# Patient Record
Sex: MALE | Employment: FULL TIME | URBAN - METROPOLITAN AREA
[De-identification: names, ages, dates, MRNs, and addresses within clinical notes are randomized per-mention and may not be internally consistent; named-entity substitution may affect disease eponyms.]

---

## 2018-07-27 ENCOUNTER — HOSPITAL ENCOUNTER (OUTPATIENT)
Dept: RADIATION ONCOLOGY | Age: 58
Discharge: HOME | End: 2018-07-27
Payer: COMMERCIAL

## 2018-07-27 VITALS
TEMPERATURE: 96.2 F | DIASTOLIC BLOOD PRESSURE: 72 MMHG | WEIGHT: 219.4 LBS | HEIGHT: 68 IN | BODY MASS INDEX: 33.25 KG/M2 | RESPIRATION RATE: 14 BRPM | SYSTOLIC BLOOD PRESSURE: 128 MMHG | OXYGEN SATURATION: 97 % | HEART RATE: 47 BPM

## 2018-07-27 DIAGNOSIS — C61 PROSTATE CANCER (HCC): ICD-10-CM

## 2018-07-27 PROCEDURE — 99212 OFFICE O/P EST SF 10 MIN: CPT | Performed by: RADIOLOGY

## 2018-07-27 RX ORDER — LISINOPRIL 5 MG/1
5 TABLET ORAL DAILY
COMMUNITY

## 2018-07-27 RX ORDER — ATORVASTATIN CALCIUM 80 MG/1
80 TABLET, FILM COATED ORAL DAILY
COMMUNITY
End: 2018-12-13 | Stop reason: ALTCHOICE

## 2018-07-27 RX ORDER — ASPIRIN 81 MG/1
81 TABLET, CHEWABLE ORAL DAILY
COMMUNITY
End: 2018-12-13 | Stop reason: ALTCHOICE

## 2018-07-27 NOTE — H&P
with intermediate risk prostate cancer, with a Gustine score of 4+3=7 with PNI, clinical [id number], and initial PSA of 4.7.  5 of 12 cores were positive, with the high-grade disease coming from the right base with PNI found at that location. I discussed treatment options with the patient. He declines consideration of surgery. I recommended either external beam radiotherapy, or the same with a brachytherapy boost.  I quoted data from the recent ASCENDE-RT trial, which supports the brachytherapy boost approach for intermediate and high risk patients. The patient wished to pursue this. Given his relatively lack of urinary symptoms, I think we could initiate therapy with the brachytherapy boost, to be followed by 6 months of androgen ablation in conjunction with external beam RT to 45 Gy. Risks and benefits were discussed in detail, informed consent signed. We also discussed relation safety issues and the logistics of the procedure. We discussed SpaceOAR gel, and I would plan to place this after the brachytherapy procedure. The patient will return in a few weeks for a volume study, to quantify prostate geometry and rule out pubic arch interference. Thank you for this consult and allowing us to participate in the care of this patient.      Electronically signed by Kelli Troy MD on 7/27/18 at 11:24 AM

## 2018-08-13 ENCOUNTER — HOSPITAL ENCOUNTER (OUTPATIENT)
Dept: RADIATION ONCOLOGY | Age: 58
Discharge: HOME | End: 2018-08-13
Payer: COMMERCIAL

## 2018-08-13 DIAGNOSIS — C61 PROSTATE CANCER (HCC): Primary | ICD-10-CM

## 2018-08-13 PROCEDURE — 99214 OFFICE O/P EST MOD 30 MIN: CPT | Performed by: RADIOLOGY

## 2018-08-13 NOTE — ONCOLOGY
No Known Allergies     CURRENT MEDICATIONS:     Prior to Admission medications    Medication Sig Start Date End Date Taking? Authorizing Provider   ticagrelor (BRILINTA) 90 MG TABS tablet Take 90 mg by mouth 2 times daily    Historical Provider, MD   aspirin 81 MG chewable tablet Take 81 mg by mouth daily    Historical Provider, MD   atorvastatin (LIPITOR) 80 MG tablet Take 80 mg by mouth daily    Historical Provider, MD   lisinopril (PRINIVIL;ZESTRIL) 5 MG tablet Take 5 mg by mouth daily    Historical Provider, MD   metoprolol tartrate (LOPRESSOR) 25 MG tablet Take 12.5 mg by mouth 2 times daily    Historical Provider, MD     ECOG PERFORMANCE STATUS: 0    PHYSICAL EXAMINATION:  GENERAL: No acute distress. Alert, oriented, cooperative. HEENT:   WNL. Remainder of exam deferred. Here for discussion only today. STUDIES: none    IMPRESSION/PLAN:    Untreated intermediate risk prostate cancer. We will hold off with definitive therapy for a while, until the patient can come off of anticoagulation for short period (1 week). I refer him back to Dr. Chandler Rutherford for a 6 month Lupron injection, to take place on Monday, August 20. I prescribed 2 weeks of Casodex, to start today, to block the testosterone flare. I will see him back in one month to assess his PSA and testosterone, and then again 6 months from now once he has seen cardiology, so we can assess whether he will be able to come off of anticoagulation. At present our plan is to eventually proceed with radiotherapy with a brachytherapy boost, with concurrent androgen ablation. A total of 35 minutes was spent today in face-to-face discussion regarding the patient's cancer diagnosis and its management. Electronically signed by Carmen Casanova MD on 8/13/18 at 4:17 PM      Thank you for allowing us to participate in the care of this patient.   cc:   No att. providers found  MD Arnold Gonzalez MD  [address] [facility]. [address]

## 2018-09-20 ENCOUNTER — HOSPITAL ENCOUNTER (OUTPATIENT)
Dept: RADIATION ONCOLOGY | Age: 58
Discharge: HOME | End: 2018-09-20
Payer: COMMERCIAL

## 2018-09-20 VITALS
BODY MASS INDEX: 34.33 KG/M2 | WEIGHT: 225.8 LBS | SYSTOLIC BLOOD PRESSURE: 151 MMHG | TEMPERATURE: 97.7 F | HEART RATE: 95 BPM | OXYGEN SATURATION: 97 % | DIASTOLIC BLOOD PRESSURE: 70 MMHG | RESPIRATION RATE: 16 BRPM

## 2018-09-20 DIAGNOSIS — C61 PROSTATE CANCER (HCC): Primary | ICD-10-CM

## 2018-09-20 PROCEDURE — 99212 OFFICE O/P EST SF 10 MIN: CPT | Performed by: RADIOLOGY

## 2018-09-20 NOTE — ONCOLOGY
RADIATION ONCOLOGY FOLLOW-UP    DATE OF VISIT: 9/20/2018    Patient Active Problem List   Diagnosis Code    Prostate cancer [facility]) C61       DIAGNOSIS: 59-year-old male with intermediate risk prostate cancer, with a Dany score of 4+3=7 with PNI, clinical [id number], and initial PSA of 4.7.  5 of 12 cores were positive, with the high-grade disease coming from the right base with PNI found at that location.      PRIOR TREATMENT: We intended to proceed with treatment, with concurrent androgen ablation and radiotherapy with a seed implant boost.  The patient had a recent cardiac event resulting in a cardiac stent, and is currently on aspirin and an anticoagulant (Brilinta).      TIME SINCE TREATMENT: N/A     INTERVAL HISTORY: Patient was premedicated with Casodex, and I received a 6 month Lupron injection from Dr. Sheldon Elaine.  I have him return today to check a PSA and testosterone, to ensure that the medication was effective in his case. The testosterone is at castrate levels, and the PSA has dropped to 0.8 as of 9/19/18. He has been experiencing fairly severe hot flashes and sweats, with drenching sweats at night. His IPSS score has gone up, primarily because he wakes up often at night due to sweats, and urinates at that time. No dysuria or hematuria. He has persistent left shoulder pain, which predates his cardiac diagnosis and did not change after his cardiac intervention and stenting procedure. He plans to discuss this with his primary physician, as it may require an orthopedic referral.  There is no loss of range of motion. The patient continues to work, but notes easy fatigue. No chest pain.     PAST MEDICAL HISTORY:   Past Medical History:   Diagnosis Date    CAD (coronary artery disease) 2018    MI   7/25/18    Hypertension        PAST SURGICAL HISTORY:  Past Surgical History:   Procedure Laterality Date    CORONARY ANGIOPLASTY WITH STENT PLACEMENT  07/25/2018        History   Smoking Status    Former in the care of this patient.   cc:   No att. providers found  MD Shelly Pompa MD  [facility].  [id number]  [place], [facility] [address]

## 2018-12-13 ENCOUNTER — HOSPITAL ENCOUNTER (OUTPATIENT)
Dept: RADIATION ONCOLOGY | Age: 58
Discharge: HOME | End: 2018-12-13
Payer: COMMERCIAL

## 2018-12-13 VITALS
OXYGEN SATURATION: 100 % | DIASTOLIC BLOOD PRESSURE: 68 MMHG | BODY MASS INDEX: 33.15 KG/M2 | WEIGHT: 218 LBS | SYSTOLIC BLOOD PRESSURE: 150 MMHG | HEART RATE: 84 BPM | RESPIRATION RATE: 16 BRPM | TEMPERATURE: 99.1 F

## 2018-12-13 DIAGNOSIS — C61 PROSTATE CANCER (HCC): Primary | ICD-10-CM

## 2018-12-13 PROCEDURE — 99212 OFFICE O/P EST SF 10 MIN: CPT | Performed by: RADIOLOGY

## 2018-12-13 RX ORDER — TRAMADOL HYDROCHLORIDE 50 MG/1
50 TABLET ORAL EVERY 6 HOURS PRN
COMMUNITY
End: 2020-01-22 | Stop reason: ALTCHOICE

## 2018-12-13 RX ORDER — NITROGLYCERIN 0.4 MG/1
0.4 TABLET SUBLINGUAL EVERY 5 MIN PRN
COMMUNITY

## 2018-12-13 RX ORDER — INSULIN GLARGINE 100 [IU]/ML
50 INJECTION, SOLUTION SUBCUTANEOUS NIGHTLY
COMMUNITY
End: 2020-01-22 | Stop reason: ALTCHOICE

## 2018-12-13 RX ORDER — ATORVASTATIN CALCIUM 20 MG/1
40 TABLET, FILM COATED ORAL NIGHTLY
COMMUNITY

## 2019-02-13 ENCOUNTER — HOSPITAL ENCOUNTER (OUTPATIENT)
Dept: RADIATION ONCOLOGY | Age: 59
Discharge: HOME | End: 2019-02-13
Payer: COMMERCIAL

## 2019-02-13 PROCEDURE — 99213 OFFICE O/P EST LOW 20 MIN: CPT | Performed by: RADIOLOGY

## 2019-02-13 PROCEDURE — 77470 SPECIAL RADIATION TREATMENT: CPT | Performed by: RADIOLOGY

## 2019-02-13 PROCEDURE — 76873 ECHOGRAP TRANS R PROS STUDY: CPT | Performed by: RADIOLOGY

## 2019-02-18 PROCEDURE — 77318 BRACHYTX ISODOSE COMPLEX: CPT | Performed by: RADIOLOGY

## 2019-03-20 ENCOUNTER — HOSPITAL ENCOUNTER (OUTPATIENT)
Dept: RADIATION ONCOLOGY | Age: 59
Discharge: HOME | End: 2019-03-20
Payer: COMMERCIAL

## 2019-03-20 PROCEDURE — 76965 ECHO GUIDANCE RADIOTHERAPY: CPT | Performed by: RADIOLOGY

## 2019-03-20 PROCEDURE — C2638 BRACHYTX, STRANDED, I-125: HCPCS

## 2019-03-20 PROCEDURE — 77778 APPLY INTERSTIT RADIAT COMPL: CPT | Performed by: RADIOLOGY

## 2019-03-20 PROCEDURE — 77318 BRACHYTX ISODOSE COMPLEX: CPT | Performed by: RADIOLOGY

## 2019-03-20 PROCEDURE — 77334 RADIATION TREATMENT AID(S): CPT | Performed by: RADIOLOGY

## 2019-03-20 PROCEDURE — 77370 RADIATION PHYSICS CONSULT: CPT | Performed by: RADIOLOGY

## 2019-03-20 PROCEDURE — 77332 RADIATION TREATMENT AID(S): CPT | Performed by: RADIOLOGY

## 2019-04-01 ENCOUNTER — HOSPITAL ENCOUNTER (OUTPATIENT)
Dept: RADIATION ONCOLOGY | Age: 59
Discharge: HOME | End: 2019-04-01
Payer: COMMERCIAL

## 2019-04-01 DIAGNOSIS — C61 PROSTATE CANCER (HCC): Primary | ICD-10-CM

## 2019-04-01 PROCEDURE — 99213 OFFICE O/P EST LOW 20 MIN: CPT | Performed by: RADIOLOGY

## 2019-04-01 PROCEDURE — 55874 TPRNL PLMT BIODEGRDABL MATRL: CPT | Performed by: RADIOLOGY

## 2019-04-01 RX ORDER — CIPROFLOXACIN 500 MG/1
500 TABLET, FILM COATED ORAL 2 TIMES DAILY
COMMUNITY
Start: 2019-04-01 | End: 2019-04-15

## 2019-04-01 NOTE — ONCOLOGY
discomfort. When finished, the ultrasound probe was removed and we cleaned the perineum and released the patient. I placed him on Cipro prophylactically. In a few weeks we plan to do the CT simulation, the seed implant quantitatively. 45Gy rio-prostatic fractionated RT is planned.       THIS REPORT HAS BEEN ELECTRONICALLY SIGNED:  4/1/2019  9:52 AM    Dock Spatz, MD

## 2019-04-01 NOTE — ONCOLOGY
discomfort. When finished, the ultrasound probe was removed and we cleaned the perineum and released the patient. I placed him on Cipro prophylactically. In a few weeks we plan to do the CT simulation, the seed implant quantitatively. 45Gy rio-prostatic fractionated RT is planned.       THIS REPORT HAS BEEN ELECTRONICALLY SIGNED:  4/1/2019  9:14 AM    Kobe Angel MD

## 2019-04-15 VITALS
RESPIRATION RATE: 16 BRPM | DIASTOLIC BLOOD PRESSURE: 70 MMHG | HEART RATE: 60 BPM | SYSTOLIC BLOOD PRESSURE: 120 MMHG | OXYGEN SATURATION: 98 % | WEIGHT: 223.8 LBS | TEMPERATURE: 98.1 F | BODY MASS INDEX: 34.03 KG/M2

## 2019-04-15 PROCEDURE — 77290 THER RAD SIMULAJ FIELD CPLX: CPT | Performed by: RADIOLOGY

## 2019-04-15 PROCEDURE — 77334 RADIATION TREATMENT AID(S): CPT | Performed by: RADIOLOGY

## 2019-04-15 PROCEDURE — 99214 OFFICE O/P EST MOD 30 MIN: CPT | Performed by: RADIOLOGY

## 2019-04-15 RX ORDER — TAMSULOSIN HYDROCHLORIDE 0.4 MG/1
0.4 CAPSULE ORAL DAILY
Qty: 90 CAPSULE | Refills: 2 | Status: SHIPPED | OUTPATIENT
Start: 2019-04-15

## 2019-04-17 PROCEDURE — 77300 RADIATION THERAPY DOSE PLAN: CPT | Performed by: RADIOLOGY

## 2019-04-17 PROCEDURE — 77295 3-D RADIOTHERAPY PLAN: CPT | Performed by: RADIOLOGY

## 2019-04-17 PROCEDURE — 77338 DESIGN MLC DEVICE FOR IMRT: CPT | Performed by: RADIOLOGY

## 2019-04-17 PROCEDURE — 77301 RADIOTHERAPY DOSE PLAN IMRT: CPT | Performed by: RADIOLOGY

## 2019-04-23 PROCEDURE — 77280 THER RAD SIMULAJ FIELD SMPL: CPT | Performed by: RADIOLOGY

## 2019-04-23 PROCEDURE — 77387 GUIDANCE FOR RADJ TX DLVR: CPT | Performed by: RADIOLOGY

## 2019-05-01 ENCOUNTER — HOSPITAL ENCOUNTER (OUTPATIENT)
Dept: RADIATION ONCOLOGY | Age: 59
Discharge: HOME | End: 2019-05-01
Payer: COMMERCIAL

## 2019-05-08 PROCEDURE — 77385 HC NTSTY MODUL RAD TX DLVR SMPL: CPT | Performed by: RADIOLOGY

## 2019-05-09 PROCEDURE — 77385 HC NTSTY MODUL RAD TX DLVR SMPL: CPT | Performed by: RADIOLOGY

## 2019-05-10 PROCEDURE — 77385 HC NTSTY MODUL RAD TX DLVR SMPL: CPT | Performed by: RADIOLOGY

## 2019-05-13 PROCEDURE — 77385 HC NTSTY MODUL RAD TX DLVR SMPL: CPT | Performed by: RADIOLOGY

## 2019-05-14 PROCEDURE — 77385 HC NTSTY MODUL RAD TX DLVR SMPL: CPT | Performed by: RADIOLOGY

## 2019-05-14 PROCEDURE — 77336 RADIATION PHYSICS CONSULT: CPT | Performed by: RADIOLOGY

## 2019-05-15 PROCEDURE — 77385 HC NTSTY MODUL RAD TX DLVR SMPL: CPT | Performed by: RADIOLOGY

## 2019-05-16 PROCEDURE — 77385 HC NTSTY MODUL RAD TX DLVR SMPL: CPT | Performed by: RADIOLOGY

## 2019-05-17 PROCEDURE — 77385 HC NTSTY MODUL RAD TX DLVR SMPL: CPT | Performed by: RADIOLOGY

## 2019-05-20 PROCEDURE — 77385 HC NTSTY MODUL RAD TX DLVR SMPL: CPT | Performed by: RADIOLOGY

## 2019-05-21 PROCEDURE — 99212 OFFICE O/P EST SF 10 MIN: CPT | Performed by: RADIOLOGY

## 2019-05-21 PROCEDURE — 77385 HC NTSTY MODUL RAD TX DLVR SMPL: CPT | Performed by: RADIOLOGY

## 2019-05-21 PROCEDURE — 77336 RADIATION PHYSICS CONSULT: CPT | Performed by: RADIOLOGY

## 2019-05-22 PROCEDURE — 77385 HC NTSTY MODUL RAD TX DLVR SMPL: CPT | Performed by: RADIOLOGY

## 2019-05-23 PROCEDURE — 77385 HC NTSTY MODUL RAD TX DLVR SMPL: CPT | Performed by: RADIOLOGY

## 2019-05-24 PROCEDURE — 77385 HC NTSTY MODUL RAD TX DLVR SMPL: CPT | Performed by: RADIOLOGY

## 2019-05-28 PROCEDURE — 99212 OFFICE O/P EST SF 10 MIN: CPT | Performed by: RADIOLOGY

## 2019-05-28 PROCEDURE — 77385 HC NTSTY MODUL RAD TX DLVR SMPL: CPT | Performed by: RADIOLOGY

## 2019-05-29 PROCEDURE — 77385 HC NTSTY MODUL RAD TX DLVR SMPL: CPT | Performed by: RADIOLOGY

## 2019-05-29 PROCEDURE — 77336 RADIATION PHYSICS CONSULT: CPT | Performed by: RADIOLOGY

## 2019-05-30 PROCEDURE — 77385 HC NTSTY MODUL RAD TX DLVR SMPL: CPT | Performed by: RADIOLOGY

## 2019-05-31 PROCEDURE — 77385 HC NTSTY MODUL RAD TX DLVR SMPL: CPT | Performed by: RADIOLOGY

## 2019-06-03 ENCOUNTER — HOSPITAL ENCOUNTER (OUTPATIENT)
Dept: RADIATION ONCOLOGY | Age: 59
Discharge: HOME | End: 2019-06-03
Payer: COMMERCIAL

## 2019-06-03 PROCEDURE — 77385 HC NTSTY MODUL RAD TX DLVR SMPL: CPT | Performed by: RADIOLOGY

## 2019-06-04 PROCEDURE — 77385 HC NTSTY MODUL RAD TX DLVR SMPL: CPT | Performed by: RADIOLOGY

## 2019-06-04 PROCEDURE — 99212 OFFICE O/P EST SF 10 MIN: CPT | Performed by: RADIOLOGY

## 2019-06-05 PROCEDURE — 77385 HC NTSTY MODUL RAD TX DLVR SMPL: CPT | Performed by: RADIOLOGY

## 2019-06-05 PROCEDURE — 77336 RADIATION PHYSICS CONSULT: CPT | Performed by: RADIOLOGY

## 2019-06-06 PROCEDURE — 77385 HC NTSTY MODUL RAD TX DLVR SMPL: CPT | Performed by: RADIOLOGY

## 2019-06-07 PROCEDURE — 77385 HC NTSTY MODUL RAD TX DLVR SMPL: CPT | Performed by: RADIOLOGY

## 2019-06-10 PROCEDURE — 77385 HC NTSTY MODUL RAD TX DLVR SMPL: CPT | Performed by: RADIOLOGY

## 2019-06-11 VITALS
RESPIRATION RATE: 16 BRPM | DIASTOLIC BLOOD PRESSURE: 82 MMHG | SYSTOLIC BLOOD PRESSURE: 142 MMHG | OXYGEN SATURATION: 98 % | BODY MASS INDEX: 33.12 KG/M2 | TEMPERATURE: 97.4 F | HEART RATE: 55 BPM | WEIGHT: 217.8 LBS

## 2019-06-11 PROCEDURE — 77385 HC NTSTY MODUL RAD TX DLVR SMPL: CPT | Performed by: RADIOLOGY

## 2019-06-11 PROCEDURE — 99213 OFFICE O/P EST LOW 20 MIN: CPT | Performed by: RADIOLOGY

## 2019-06-12 PROCEDURE — 77385 HC NTSTY MODUL RAD TX DLVR SMPL: CPT | Performed by: RADIOLOGY

## 2019-06-12 PROCEDURE — 77336 RADIATION PHYSICS CONSULT: CPT | Performed by: RADIOLOGY

## 2019-06-14 NOTE — ONCOLOGY
June 14, 2019        Jasmeet Avila MD  [address]    Radiation Oncology Completion Letter    Patient Name:  Juan David Xiao  Medical Record #: [id number]   Date of Birth: [birth year]  Diagnosis:  Intermediate risk prostate cancer. PSA 4.7, [place] 4+3=7 with PNI, T2a (right sided midgland nodule). Treatment Dates: 5/8/19 - 6/12/19    Site: Dose: Total # fractions: Dose per fraction: Energy: Prescription Isodose: Technique   Prostate boost 115Gy Implant on 3/20/19  Iodine 125 MPD Brachytherapy      PTV Prostate + SV + margin 45Gy 25 1.8 Gy 10 MV photons 97.5% Isodose line IMRT/IGRT, VMAT     Concurrent therapy:  Lupron 6 month, started 6m prior to the seed implant, to delay therapy because of recent cardiac event and diagnosis of diabetes    Technique:   Intensity modulation (IMRT) was used to optimize the dose distribution and spare normal tissue toxicity, sparing dose to the rectum, bladder, femoral heads, penile bulb, and bowel. This was delivered with a set of 2 counter rotating VMAT arcs using 10MV photons modulated with an [address]. Daily cone beam CT image guidance (IGRT) was used to allow reduced planning margins reducing potential side effects, aligning to implanted I125 seeds. After the seed implant, SpaceOAR gel was placed to reduce the risk of late rectal morbidity. Treatment course:   Mr. Camron Castano tolerated radiation treatment well with expected tolerable side effects. At completion of therapy he had minimal urinary and rectal symptoms. A week prior treatment he experienced pain involving the testes, that spontaneously resolved. There was no trauma, and may have been associated with lifting at work. Such symptoms would not be expected from radiotherapy or brachytherapy. Plan: f/u in 1 month. PSA drawn, to be repeated in 3-6m. Also following with Dr. Kilo Joy.    Thank you again for allowing us to participate in the care of this patient.      Sincerely,       Saulo Tejada MD, PhD  Board Certified Radiation Oncologist  [facility] affiliated with  [id number] [facility]    cc: Dr. Phi Garcia

## 2019-07-25 ENCOUNTER — HOSPITAL ENCOUNTER (OUTPATIENT)
Dept: RADIATION ONCOLOGY | Age: 59
Discharge: HOME | End: 2019-07-25
Payer: COMMERCIAL

## 2019-07-25 VITALS
WEIGHT: 216.2 LBS | RESPIRATION RATE: 16 BRPM | SYSTOLIC BLOOD PRESSURE: 139 MMHG | TEMPERATURE: 97.2 F | DIASTOLIC BLOOD PRESSURE: 67 MMHG | HEART RATE: 57 BPM | BODY MASS INDEX: 32.87 KG/M2

## 2019-07-25 DIAGNOSIS — C61 PROSTATE CANCER (HCC): Primary | ICD-10-CM

## 2019-07-25 PROCEDURE — 99213 OFFICE O/P EST LOW 20 MIN: CPT | Performed by: RADIOLOGY

## 2020-01-22 ENCOUNTER — HOSPITAL ENCOUNTER (OUTPATIENT)
Dept: RADIATION ONCOLOGY | Age: 60
Discharge: HOME | End: 2020-01-22
Payer: COMMERCIAL

## 2020-01-22 PROCEDURE — 99212 OFFICE O/P EST SF 10 MIN: CPT | Performed by: RADIOLOGY

## 2020-01-22 RX ORDER — PIOGLITAZONEHYDROCHLORIDE 30 MG/1
30 TABLET ORAL DAILY
COMMUNITY

## 2020-01-22 RX ORDER — TADALAFIL 10 MG/1
10 TABLET ORAL DAILY PRN
Qty: 30 TABLET | Refills: 3 | Status: SHIPPED | OUTPATIENT
Start: 2020-01-22

## 2020-01-22 RX ORDER — CLOPIDOGREL BISULFATE 75 MG/1
75 TABLET ORAL DAILY
COMMUNITY

## 2020-01-22 NOTE — ONCOLOGY
NURSING ASSESSMENT     Date: 1/22/2020        Patient Name: Linde Claude     YOB: 1960      Age:  62 y.o. MRN: [id number]     Chaperone [] Yes   [] No      Advance Directives:   Do you currently have completed advance directives (living will)? [] Yes   [x] No         *If yes, please bring us a copy for your records. *If no, would you like info or assistance in completing advance directives (living will)? [] Yes   [x] No    Pain Score:   Pain Score (1-10): 0   Pain Location: 0   Pain Duration: 0  Pain Management/Control: 0      Is pain affecting your ability to take care of yourself or move throughout your home? [] Yes   [x] No    General: No Problems  Patient has gained weight [] Yes   [] No  Patient has lost weight [] Yes   [] No  How much weight in pounds and over what length of time:     Eyes (Ophthalmic): No Problem     Skin (Dermatological): No Problems     ENT: No Problems     Respiratory: No Problems     Cardiovascular: No Problems  Hx of MI l2018        Gastrointestinal: had abd pain with new me, that was changed    Genito-Urinary: see IPSS     Breast: No Problems     Musculoskeletal: No Problems  Normal aches and pains    Neurological: No Problems      Hematological and Lymphatic: No Problems     Additional Comments:  No erections poss. Concerned about this. Has desire but no function. Off insulin.

## 2020-01-22 NOTE — ONCOLOGY
ALLERGIES:   No Known Allergies     CURRENT MEDICATIONS:     Prior to Admission medications    Medication Sig Start Date End Date Taking? Authorizing Provider   pioglitazone (ACTOS) 30 MG tablet Take 30 mg by mouth daily   Yes Historical Provider, MD   clopidogrel (PLAVIX) 75 MG tablet Take 75 mg by mouth daily   Yes Historical Provider, MD   tamsulosin (FLOMAX) 0.4 MG capsule Take 1 capsule by mouth daily , best 1/2 hour after last meal of the day. 4/15/19  Yes Zachary Demarco MD   atorvastatin (LIPITOR) 20 MG tablet Take 40 mg by mouth nightly    Yes Historical Provider, MD   lisinopril (PRINIVIL;ZESTRIL) 5 MG tablet Take 5 mg by mouth daily   Yes Historical Provider, MD   metoprolol tartrate (LOPRESSOR) 25 MG tablet Take 12.5 mg by mouth 2 times daily   Yes Historical Provider, MD   nitroGLYCERIN (NITROSTAT) 0.4 MG SL tablet Place 0.4 mg under the tongue every 5 minutes as needed for Chest pain up to max of 3 total doses. If no relief after 1 dose, call 911. Historical Provider, MD       I have personally reviewed and reconciled the medications listed. Review Of Systems:   Pain Score:   Pain Score (1-10): 0   General: No Problems, no weight loss or loss of appetite. Eyes (Ophthalmic): No Problem              Skin (Dermatological): No Problems              ENT: No Problems              Respiratory: No Problems              Cardiovascular: Hx of MI 2018, no current symptoms                    Gastrointestinal: had abd pain with new me, that was changed   Genito-Urinary:  Nocturia x1-2 on Flomax, otherwise worse with difficulty starting stream.  No dysuria or hematuria. ED. Breast: No Problems              Musculoskeletal: No Problems  Normal aches and pains   Neurological: No Problems                          Hematological and Lymphatic: No Problems  A 10-point review of systems has been conducted and pertinent positives have been   recorded. All other review of systems are negative.      ECOG PERFORMANCE STATUS:    VITAL SIGNS:    There were no vitals filed for this visit. PHYSICAL EXAMINATION:  GENERAL: No acute distress. Alert, oriented, cooperative. HEENT:  PERRLA, EOMI. Oral cavity WNL. NECK:  No cervical or supraclavicular adenopathy. CHEST/LUNGS: CTA, no rib or spine tenderness  CARDIOVASCULAR:  RRR, no audible murmur  ABDOMEN: Soft, benign, normal bowel sounds. no bladder distention  EXTREMITIES: No C/C/E   RECTAL: Patient refused    STUDIES: 12/17/2019 PSA 1.3    IMPRESSION/PLAN:    Clinically BETH, with no significant urinary or rectal symptoms in the aftermath of prostate radiotherapy. Erectile dysfunction remains a problem, multifactorial.  I prescribed Cialis. The patient has a prescription for nitroglycerin, but has never used. I advised him of the risks of using these medications together. I plan to see him back in 6 months. He will be seeing Dr. Bayron Flanagan in August.    Electronically signed by Edwige Sanchez MD on 1/22/20 at 4:33 PM      Thank you for allowing us to participate in the care of this patient.   cc:   No att. providers found  MD Greg Miguel MD R Germana T[address]nger [address] [place] [address]

## 2023-03-17 LAB
ANION GAP IN SER/PLAS: 11 MMOL/L (ref 10–20)
CALCIUM (MG/DL) IN SER/PLAS: 8.6 MG/DL (ref 8.6–10.3)
CARBON DIOXIDE, TOTAL (MMOL/L) IN SER/PLAS: 29 MMOL/L (ref 21–32)
CHLORIDE (MMOL/L) IN SER/PLAS: 104 MMOL/L (ref 98–107)
CREATININE (MG/DL) IN SER/PLAS: 1.33 MG/DL (ref 0.5–1.3)
GFR MALE: 59 ML/MIN/1.73M2
GLUCOSE (MG/DL) IN SER/PLAS: 122 MG/DL (ref 74–99)
POTASSIUM (MMOL/L) IN SER/PLAS: 4.5 MMOL/L (ref 3.5–5.3)
SODIUM (MMOL/L) IN SER/PLAS: 139 MMOL/L (ref 136–145)
UREA NITROGEN (MG/DL) IN SER/PLAS: 16 MG/DL (ref 6–23)

## 2023-06-10 LAB
ALANINE AMINOTRANSFERASE (SGPT) (U/L) IN SER/PLAS: 32 U/L (ref 10–52)
ALBUMIN (G/DL) IN SER/PLAS: 4.2 G/DL (ref 3.4–5)
ALKALINE PHOSPHATASE (U/L) IN SER/PLAS: 66 U/L (ref 33–136)
ANION GAP IN SER/PLAS: 12 MMOL/L (ref 10–20)
ASPARTATE AMINOTRANSFERASE (SGOT) (U/L) IN SER/PLAS: 18 U/L (ref 9–39)
BASOPHILS (10*3/UL) IN BLOOD BY AUTOMATED COUNT: 0.05 X10E9/L (ref 0–0.1)
BASOPHILS/100 LEUKOCYTES IN BLOOD BY AUTOMATED COUNT: 1.4 % (ref 0–2)
BILIRUBIN TOTAL (MG/DL) IN SER/PLAS: 0.9 MG/DL (ref 0–1.2)
CALCIUM (MG/DL) IN SER/PLAS: 9 MG/DL (ref 8.6–10.3)
CARBON DIOXIDE, TOTAL (MMOL/L) IN SER/PLAS: 28 MMOL/L (ref 21–32)
CHLORIDE (MMOL/L) IN SER/PLAS: 105 MMOL/L (ref 98–107)
CHOLESTEROL (MG/DL) IN SER/PLAS: 118 MG/DL (ref 0–199)
CHOLESTEROL IN HDL (MG/DL) IN SER/PLAS: 49.6 MG/DL
CHOLESTEROL/HDL RATIO: 2.4
CREATININE (MG/DL) IN SER/PLAS: 1.35 MG/DL (ref 0.5–1.3)
EOSINOPHILS (10*3/UL) IN BLOOD BY AUTOMATED COUNT: 0.15 X10E9/L (ref 0–0.7)
EOSINOPHILS/100 LEUKOCYTES IN BLOOD BY AUTOMATED COUNT: 4.1 % (ref 0–6)
ERYTHROCYTE DISTRIBUTION WIDTH (RATIO) BY AUTOMATED COUNT: 12.5 % (ref 11.5–14.5)
ERYTHROCYTE MEAN CORPUSCULAR HEMOGLOBIN CONCENTRATION (G/DL) BY AUTOMATED: 32.5 G/DL (ref 32–36)
ERYTHROCYTE MEAN CORPUSCULAR VOLUME (FL) BY AUTOMATED COUNT: 95 FL (ref 80–100)
ERYTHROCYTES (10*6/UL) IN BLOOD BY AUTOMATED COUNT: 4.49 X10E12/L (ref 4.5–5.9)
ESTIMATED AVERAGE GLUCOSE FOR HBA1C: 114 MG/DL
GFR MALE: 58 ML/MIN/1.73M2
GLUCOSE (MG/DL) IN SER/PLAS: 110 MG/DL (ref 74–99)
HEMATOCRIT (%) IN BLOOD BY AUTOMATED COUNT: 42.5 % (ref 41–52)
HEMOGLOBIN (G/DL) IN BLOOD: 13.8 G/DL (ref 13.5–17.5)
HEMOGLOBIN A1C/HEMOGLOBIN TOTAL IN BLOOD: 5.6 %
IMMATURE GRANULOCYTES/100 LEUKOCYTES IN BLOOD BY AUTOMATED COUNT: 0.3 % (ref 0–0.9)
LDL: 57 MG/DL (ref 0–99)
LEUKOCYTES (10*3/UL) IN BLOOD BY AUTOMATED COUNT: 3.6 X10E9/L (ref 4.4–11.3)
LYMPHOCYTES (10*3/UL) IN BLOOD BY AUTOMATED COUNT: 1.13 X10E9/L (ref 1.2–4.8)
LYMPHOCYTES/100 LEUKOCYTES IN BLOOD BY AUTOMATED COUNT: 31 % (ref 13–44)
MONOCYTES (10*3/UL) IN BLOOD BY AUTOMATED COUNT: 0.41 X10E9/L (ref 0.1–1)
MONOCYTES/100 LEUKOCYTES IN BLOOD BY AUTOMATED COUNT: 11.3 % (ref 2–10)
NEUTROPHILS (10*3/UL) IN BLOOD BY AUTOMATED COUNT: 1.89 X10E9/L (ref 1.2–7.7)
NEUTROPHILS/100 LEUKOCYTES IN BLOOD BY AUTOMATED COUNT: 51.9 % (ref 40–80)
PLATELETS (10*3/UL) IN BLOOD AUTOMATED COUNT: 214 X10E9/L (ref 150–450)
POTASSIUM (MMOL/L) IN SER/PLAS: 4.6 MMOL/L (ref 3.5–5.3)
PROTEIN TOTAL: 6.4 G/DL (ref 6.4–8.2)
RHEUMATOID FACTOR (IU/ML) IN SERUM OR PLASMA: <10 IU/ML (ref 0–15)
SEDIMENTATION RATE, ERYTHROCYTE: <1 MM/H (ref 0–20)
SODIUM (MMOL/L) IN SER/PLAS: 140 MMOL/L (ref 136–145)
TRIGLYCERIDE (MG/DL) IN SER/PLAS: 58 MG/DL (ref 0–149)
UREA NITROGEN (MG/DL) IN SER/PLAS: 12 MG/DL (ref 6–23)
VLDL: 12 MG/DL (ref 0–40)

## 2023-11-01 ENCOUNTER — LAB (OUTPATIENT)
Dept: LAB | Facility: LAB | Age: 63
End: 2023-11-01
Payer: COMMERCIAL

## 2023-11-01 DIAGNOSIS — I12.9 HYPERTENSIVE CHRONIC KIDNEY DISEASE WITH STAGE 1 THROUGH STAGE 4 CHRONIC KIDNEY DISEASE, OR UNSPECIFIED CHRONIC KIDNEY DISEASE: ICD-10-CM

## 2023-11-01 DIAGNOSIS — E11.9 TYPE 2 DIABETES MELLITUS WITHOUT COMPLICATIONS (MULTI): ICD-10-CM

## 2023-11-01 DIAGNOSIS — E78.49 OTHER HYPERLIPIDEMIA: ICD-10-CM

## 2023-11-01 DIAGNOSIS — N18.31 CHRONIC KIDNEY DISEASE, STAGE 3A (MULTI): Primary | ICD-10-CM

## 2023-11-01 LAB
ALBUMIN SERPL BCP-MCNC: 4.4 G/DL (ref 3.4–5)
ALP SERPL-CCNC: 54 U/L (ref 33–136)
ALT SERPL W P-5'-P-CCNC: 33 U/L (ref 10–52)
ANION GAP SERPL CALC-SCNC: 15 MMOL/L (ref 10–20)
APPEARANCE UR: CLEAR
AST SERPL W P-5'-P-CCNC: 23 U/L (ref 9–39)
BASOPHILS # BLD AUTO: 0.07 X10*3/UL (ref 0–0.1)
BASOPHILS NFR BLD AUTO: 1.5 %
BILIRUB SERPL-MCNC: 1.4 MG/DL (ref 0–1.2)
BILIRUB UR STRIP.AUTO-MCNC: NEGATIVE MG/DL
BUN SERPL-MCNC: 22 MG/DL (ref 6–23)
CALCIUM SERPL-MCNC: 9.1 MG/DL (ref 8.6–10.3)
CHLORIDE SERPL-SCNC: 104 MMOL/L (ref 98–107)
CHOLEST SERPL-MCNC: 122 MG/DL (ref 0–199)
CHOLESTEROL/HDL RATIO: 2
CO2 SERPL-SCNC: 26 MMOL/L (ref 21–32)
COLOR UR: YELLOW
CREAT SERPL-MCNC: 1.2 MG/DL (ref 0.5–1.3)
CREAT UR-MCNC: 319.3 MG/DL (ref 20–370)
EOSINOPHIL # BLD AUTO: 0.11 X10*3/UL (ref 0–0.7)
EOSINOPHIL NFR BLD AUTO: 2.3 %
ERYTHROCYTE [DISTWIDTH] IN BLOOD BY AUTOMATED COUNT: 12.3 % (ref 11.5–14.5)
GFR SERPL CREATININE-BSD FRML MDRD: 67 ML/MIN/1.73M*2
GLUCOSE SERPL-MCNC: 68 MG/DL (ref 74–99)
GLUCOSE UR STRIP.AUTO-MCNC: NEGATIVE MG/DL
HCT VFR BLD AUTO: 42 % (ref 41–52)
HDLC SERPL-MCNC: 60.4 MG/DL
HGB BLD-MCNC: 13.7 G/DL (ref 13.5–17.5)
IMM GRANULOCYTES # BLD AUTO: 0.01 X10*3/UL (ref 0–0.7)
IMM GRANULOCYTES NFR BLD AUTO: 0.2 % (ref 0–0.9)
KETONES UR STRIP.AUTO-MCNC: ABNORMAL MG/DL
LDLC SERPL CALC-MCNC: 54 MG/DL
LEUKOCYTE ESTERASE UR QL STRIP.AUTO: NEGATIVE
LYMPHOCYTES # BLD AUTO: 1.13 X10*3/UL (ref 1.2–4.8)
LYMPHOCYTES NFR BLD AUTO: 23.8 %
MCH RBC QN AUTO: 30.8 PG (ref 26–34)
MCHC RBC AUTO-ENTMCNC: 32.6 G/DL (ref 32–36)
MCV RBC AUTO: 94 FL (ref 80–100)
MICROALBUMIN UR-MCNC: 7.6 MG/L
MICROALBUMIN/CREAT UR: 2.4 UG/MG CREAT
MONOCYTES # BLD AUTO: 0.39 X10*3/UL (ref 0.1–1)
MONOCYTES NFR BLD AUTO: 8.2 %
MUCOUS THREADS #/AREA URNS AUTO: NORMAL /LPF
NEUTROPHILS # BLD AUTO: 3.04 X10*3/UL (ref 1.2–7.7)
NEUTROPHILS NFR BLD AUTO: 64 %
NITRITE UR QL STRIP.AUTO: NEGATIVE
NON HDL CHOLESTEROL: 62 MG/DL (ref 0–149)
NRBC BLD-RTO: 0 /100 WBCS (ref 0–0)
PH UR STRIP.AUTO: 5 [PH]
PHOSPHATE SERPL-MCNC: 3.4 MG/DL (ref 2.5–4.9)
PLATELET # BLD AUTO: 184 X10*3/UL (ref 150–450)
POTASSIUM SERPL-SCNC: 4.1 MMOL/L (ref 3.5–5.3)
PROT SERPL-MCNC: 6.5 G/DL (ref 6.4–8.2)
PROT UR STRIP.AUTO-MCNC: ABNORMAL MG/DL
PTH-INTACT SERPL-MCNC: 73.4 PG/ML (ref 18.5–88)
RBC # BLD AUTO: 4.45 X10*6/UL (ref 4.5–5.9)
RBC # UR STRIP.AUTO: NEGATIVE /UL
RBC #/AREA URNS AUTO: NORMAL /HPF
SODIUM SERPL-SCNC: 141 MMOL/L (ref 136–145)
SP GR UR STRIP.AUTO: 1.03
TRIGL SERPL-MCNC: 40 MG/DL (ref 0–149)
UROBILINOGEN UR STRIP.AUTO-MCNC: <2 MG/DL
VLDL: 8 MG/DL (ref 0–40)
WBC # BLD AUTO: 4.8 X10*3/UL (ref 4.4–11.3)
WBC #/AREA URNS AUTO: NORMAL /HPF

## 2023-11-01 PROCEDURE — 85025 COMPLETE CBC W/AUTO DIFF WBC: CPT

## 2023-11-01 PROCEDURE — 83970 ASSAY OF PARATHORMONE: CPT

## 2023-11-01 PROCEDURE — 82570 ASSAY OF URINE CREATININE: CPT

## 2023-11-01 PROCEDURE — 36415 COLL VENOUS BLD VENIPUNCTURE: CPT

## 2023-11-01 PROCEDURE — 80053 COMPREHEN METABOLIC PANEL: CPT

## 2023-11-01 PROCEDURE — 81001 URINALYSIS AUTO W/SCOPE: CPT

## 2023-11-01 PROCEDURE — 82043 UR ALBUMIN QUANTITATIVE: CPT

## 2023-11-01 PROCEDURE — 84100 ASSAY OF PHOSPHORUS: CPT

## 2023-11-01 PROCEDURE — 80061 LIPID PANEL: CPT

## 2024-02-02 PROBLEM — R07.9 CHEST PAIN: Status: ACTIVE | Noted: 2024-02-02

## 2024-02-02 PROBLEM — R94.31 ABNORMAL EKG: Status: ACTIVE | Noted: 2024-02-02

## 2024-02-02 PROBLEM — I10 BENIGN ESSENTIAL HYPERTENSION: Status: ACTIVE | Noted: 2024-02-02

## 2024-02-02 PROBLEM — I25.10 CORONARY ARTERY DISEASE WITHOUT ANGINA PECTORIS: Status: ACTIVE | Noted: 2024-02-02

## 2024-02-02 PROBLEM — E78.5 HYPERLIPIDEMIA: Status: ACTIVE | Noted: 2024-02-02

## 2024-02-02 PROBLEM — R01.1 CARDIAC MURMUR: Status: ACTIVE | Noted: 2024-02-02

## 2024-02-02 PROBLEM — E11.9 DIABETES MELLITUS (MULTI): Status: ACTIVE | Noted: 2024-02-02

## 2024-02-02 PROBLEM — R06.00 DYSPNEA: Status: ACTIVE | Noted: 2024-02-02

## 2024-02-02 PROBLEM — Z98.61 HISTORY OF PTCA: Status: ACTIVE | Noted: 2024-02-02

## 2024-02-02 RX ORDER — CLOPIDOGREL BISULFATE 75 MG/1
75 TABLET ORAL DAILY
COMMUNITY

## 2024-02-02 RX ORDER — HYDROXYCHLOROQUINE SULFATE 200 MG/1
1 TABLET, FILM COATED ORAL 2 TIMES DAILY
COMMUNITY
Start: 2023-09-12

## 2024-02-02 RX ORDER — LISINOPRIL 10 MG/1
10 TABLET ORAL DAILY
COMMUNITY

## 2024-02-02 RX ORDER — TAMSULOSIN HYDROCHLORIDE 0.4 MG/1
0.4 CAPSULE ORAL DAILY
COMMUNITY
Start: 2019-04-15

## 2024-02-02 RX ORDER — NITROGLYCERIN 0.4 MG/1
0.4 TABLET SUBLINGUAL EVERY 5 MIN PRN
COMMUNITY

## 2024-02-02 RX ORDER — GABAPENTIN 100 MG/1
100 CAPSULE ORAL 2 TIMES DAILY
COMMUNITY
Start: 2022-04-18

## 2024-02-02 RX ORDER — OXAPROZIN 600 MG/1
600 TABLET, FILM COATED ORAL 2 TIMES DAILY
COMMUNITY
Start: 2023-06-06

## 2024-02-02 RX ORDER — METOPROLOL SUCCINATE 25 MG/1
25 TABLET, EXTENDED RELEASE ORAL DAILY
COMMUNITY

## 2024-02-02 RX ORDER — ATORVASTATIN CALCIUM 80 MG/1
80 TABLET, FILM COATED ORAL NIGHTLY
COMMUNITY

## 2024-03-11 ENCOUNTER — LAB (OUTPATIENT)
Dept: LAB | Facility: LAB | Age: 64
End: 2024-03-11
Payer: COMMERCIAL

## 2024-03-11 DIAGNOSIS — E11.9 TYPE 2 DIABETES MELLITUS WITHOUT COMPLICATIONS (MULTI): ICD-10-CM

## 2024-03-11 DIAGNOSIS — N18.2 CHRONIC KIDNEY DISEASE, STAGE 2 (MILD): Primary | ICD-10-CM

## 2024-03-11 DIAGNOSIS — I12.9 HYPERTENSIVE CHRONIC KIDNEY DISEASE WITH STAGE 1 THROUGH STAGE 4 CHRONIC KIDNEY DISEASE, OR UNSPECIFIED CHRONIC KIDNEY DISEASE: ICD-10-CM

## 2024-03-11 DIAGNOSIS — Z12.5 ENCOUNTER FOR SCREENING FOR MALIGNANT NEOPLASM OF PROSTATE: ICD-10-CM

## 2024-03-11 LAB
ALBUMIN SERPL BCP-MCNC: 4.1 G/DL (ref 3.4–5)
ALP SERPL-CCNC: 57 U/L (ref 33–136)
ALT SERPL W P-5'-P-CCNC: 35 U/L (ref 10–52)
ANION GAP SERPL CALC-SCNC: 10 MMOL/L (ref 10–20)
AST SERPL W P-5'-P-CCNC: 18 U/L (ref 9–39)
BASOPHILS # BLD AUTO: 0.06 X10*3/UL (ref 0–0.1)
BASOPHILS NFR BLD AUTO: 1.6 %
BILIRUB SERPL-MCNC: 1 MG/DL (ref 0–1.2)
BUN SERPL-MCNC: 13 MG/DL (ref 6–23)
CALCIUM SERPL-MCNC: 9.1 MG/DL (ref 8.6–10.3)
CHLORIDE SERPL-SCNC: 105 MMOL/L (ref 98–107)
CO2 SERPL-SCNC: 29 MMOL/L (ref 21–32)
CREAT SERPL-MCNC: 1.26 MG/DL (ref 0.5–1.3)
EGFRCR SERPLBLD CKD-EPI 2021: 63 ML/MIN/1.73M*2
EOSINOPHIL # BLD AUTO: 0.08 X10*3/UL (ref 0–0.7)
EOSINOPHIL NFR BLD AUTO: 2.1 %
ERYTHROCYTE [DISTWIDTH] IN BLOOD BY AUTOMATED COUNT: 12.5 % (ref 11.5–14.5)
EST. AVERAGE GLUCOSE BLD GHB EST-MCNC: 103 MG/DL
GLUCOSE SERPL-MCNC: 109 MG/DL (ref 74–99)
HBA1C MFR BLD: 5.2 %
HCT VFR BLD AUTO: 39.7 % (ref 41–52)
HGB BLD-MCNC: 13 G/DL (ref 13.5–17.5)
IMM GRANULOCYTES # BLD AUTO: 0.02 X10*3/UL (ref 0–0.7)
IMM GRANULOCYTES NFR BLD AUTO: 0.5 % (ref 0–0.9)
LYMPHOCYTES # BLD AUTO: 1.02 X10*3/UL (ref 1.2–4.8)
LYMPHOCYTES NFR BLD AUTO: 26.5 %
MCH RBC QN AUTO: 31 PG (ref 26–34)
MCHC RBC AUTO-ENTMCNC: 32.7 G/DL (ref 32–36)
MCV RBC AUTO: 95 FL (ref 80–100)
MONOCYTES # BLD AUTO: 0.32 X10*3/UL (ref 0.1–1)
MONOCYTES NFR BLD AUTO: 8.3 %
NEUTROPHILS # BLD AUTO: 2.35 X10*3/UL (ref 1.2–7.7)
NEUTROPHILS NFR BLD AUTO: 61 %
NRBC BLD-RTO: 0 /100 WBCS (ref 0–0)
PHOSPHATE SERPL-MCNC: 3.5 MG/DL (ref 2.5–4.9)
PLATELET # BLD AUTO: 194 X10*3/UL (ref 150–450)
POTASSIUM SERPL-SCNC: 4.4 MMOL/L (ref 3.5–5.3)
PROT SERPL-MCNC: 6.1 G/DL (ref 6.4–8.2)
PSA SERPL-MCNC: 0.17 NG/ML
PTH-INTACT SERPL-MCNC: 63.1 PG/ML (ref 18.5–88)
RBC # BLD AUTO: 4.2 X10*6/UL (ref 4.5–5.9)
SODIUM SERPL-SCNC: 140 MMOL/L (ref 136–145)
WBC # BLD AUTO: 3.9 X10*3/UL (ref 4.4–11.3)

## 2024-03-11 PROCEDURE — 83970 ASSAY OF PARATHORMONE: CPT

## 2024-03-11 PROCEDURE — 84153 ASSAY OF PSA TOTAL: CPT

## 2024-03-11 PROCEDURE — 80053 COMPREHEN METABOLIC PANEL: CPT

## 2024-03-11 PROCEDURE — 36415 COLL VENOUS BLD VENIPUNCTURE: CPT

## 2024-03-11 PROCEDURE — 84100 ASSAY OF PHOSPHORUS: CPT

## 2024-03-11 PROCEDURE — 83036 HEMOGLOBIN GLYCOSYLATED A1C: CPT

## 2024-03-11 PROCEDURE — 85025 COMPLETE CBC W/AUTO DIFF WBC: CPT

## 2024-07-05 ENCOUNTER — LAB (OUTPATIENT)
Dept: LAB | Facility: LAB | Age: 64
End: 2024-07-05
Payer: COMMERCIAL

## 2024-07-05 DIAGNOSIS — E11.9 TYPE 2 DIABETES MELLITUS WITHOUT COMPLICATIONS (MULTI): ICD-10-CM

## 2024-07-05 DIAGNOSIS — E78.49 OTHER HYPERLIPIDEMIA: ICD-10-CM

## 2024-07-05 DIAGNOSIS — N18.2 CHRONIC KIDNEY DISEASE, STAGE 2 (MILD): ICD-10-CM

## 2024-07-05 DIAGNOSIS — I12.9 HYPERTENSIVE CHRONIC KIDNEY DISEASE WITH STAGE 1 THROUGH STAGE 4 CHRONIC KIDNEY DISEASE, OR UNSPECIFIED CHRONIC KIDNEY DISEASE: Primary | ICD-10-CM

## 2024-07-05 LAB
ALBUMIN SERPL BCP-MCNC: 4.1 G/DL (ref 3.4–5)
ALP SERPL-CCNC: 61 U/L (ref 33–136)
ALT SERPL W P-5'-P-CCNC: 49 U/L (ref 10–52)
ANION GAP SERPL CALC-SCNC: 10 MMOL/L (ref 10–20)
APPEARANCE UR: CLEAR
AST SERPL W P-5'-P-CCNC: 26 U/L (ref 9–39)
BASOPHILS # BLD AUTO: 0.05 X10*3/UL (ref 0–0.1)
BASOPHILS NFR BLD AUTO: 1.1 %
BILIRUB SERPL-MCNC: 0.7 MG/DL (ref 0–1.2)
BILIRUB UR STRIP.AUTO-MCNC: NEGATIVE MG/DL
BUN SERPL-MCNC: 15 MG/DL (ref 6–23)
CALCIUM SERPL-MCNC: 8.9 MG/DL (ref 8.6–10.3)
CHLORIDE SERPL-SCNC: 109 MMOL/L (ref 98–107)
CHOLEST SERPL-MCNC: 124 MG/DL (ref 0–199)
CHOLESTEROL/HDL RATIO: 2.4
CO2 SERPL-SCNC: 26 MMOL/L (ref 21–32)
COLOR UR: NORMAL
CREAT SERPL-MCNC: 1.35 MG/DL (ref 0.5–1.3)
CREAT UR-MCNC: 230.8 MG/DL (ref 20–370)
EGFRCR SERPLBLD CKD-EPI 2021: 58 ML/MIN/1.73M*2
EOSINOPHIL # BLD AUTO: 0.2 X10*3/UL (ref 0–0.7)
EOSINOPHIL NFR BLD AUTO: 4.3 %
ERYTHROCYTE [DISTWIDTH] IN BLOOD BY AUTOMATED COUNT: 12.2 % (ref 11.5–14.5)
EST. AVERAGE GLUCOSE BLD GHB EST-MCNC: 103 MG/DL
GLUCOSE SERPL-MCNC: 94 MG/DL (ref 74–99)
GLUCOSE UR STRIP.AUTO-MCNC: NORMAL MG/DL
HBA1C MFR BLD: 5.2 %
HCT VFR BLD AUTO: 40.3 % (ref 41–52)
HDLC SERPL-MCNC: 51.9 MG/DL
HGB BLD-MCNC: 13.2 G/DL (ref 13.5–17.5)
IMM GRANULOCYTES # BLD AUTO: 0.02 X10*3/UL (ref 0–0.7)
IMM GRANULOCYTES NFR BLD AUTO: 0.4 % (ref 0–0.9)
KETONES UR STRIP.AUTO-MCNC: NEGATIVE MG/DL
LDLC SERPL CALC-MCNC: 58 MG/DL
LEUKOCYTE ESTERASE UR QL STRIP.AUTO: NEGATIVE
LYMPHOCYTES # BLD AUTO: 1.32 X10*3/UL (ref 1.2–4.8)
LYMPHOCYTES NFR BLD AUTO: 28.2 %
MCH RBC QN AUTO: 31 PG (ref 26–34)
MCHC RBC AUTO-ENTMCNC: 32.8 G/DL (ref 32–36)
MCV RBC AUTO: 95 FL (ref 80–100)
MICROALBUMIN UR-MCNC: <7 MG/L
MICROALBUMIN/CREAT UR: NORMAL MG/G{CREAT}
MONOCYTES # BLD AUTO: 0.51 X10*3/UL (ref 0.1–1)
MONOCYTES NFR BLD AUTO: 10.9 %
NEUTROPHILS # BLD AUTO: 2.58 X10*3/UL (ref 1.2–7.7)
NEUTROPHILS NFR BLD AUTO: 55.1 %
NITRITE UR QL STRIP.AUTO: NEGATIVE
NON HDL CHOLESTEROL: 72 MG/DL (ref 0–149)
NRBC BLD-RTO: 0 /100 WBCS (ref 0–0)
PH UR STRIP.AUTO: 5.5 [PH]
PLATELET # BLD AUTO: 246 X10*3/UL (ref 150–450)
POTASSIUM SERPL-SCNC: 4.5 MMOL/L (ref 3.5–5.3)
PROT SERPL-MCNC: 6.1 G/DL (ref 6.4–8.2)
PROT UR STRIP.AUTO-MCNC: NEGATIVE MG/DL
RBC # BLD AUTO: 4.26 X10*6/UL (ref 4.5–5.9)
RBC # UR STRIP.AUTO: NEGATIVE /UL
SODIUM SERPL-SCNC: 140 MMOL/L (ref 136–145)
SP GR UR STRIP.AUTO: 1.02
TRIGL SERPL-MCNC: 70 MG/DL (ref 0–149)
UROBILINOGEN UR STRIP.AUTO-MCNC: NORMAL MG/DL
VLDL: 14 MG/DL (ref 0–40)
WBC # BLD AUTO: 4.7 X10*3/UL (ref 4.4–11.3)

## 2024-07-05 PROCEDURE — 36415 COLL VENOUS BLD VENIPUNCTURE: CPT

## 2024-07-05 PROCEDURE — 85025 COMPLETE CBC W/AUTO DIFF WBC: CPT

## 2024-07-05 PROCEDURE — 80061 LIPID PANEL: CPT

## 2024-07-05 PROCEDURE — 81003 URINALYSIS AUTO W/O SCOPE: CPT

## 2024-07-05 PROCEDURE — 83036 HEMOGLOBIN GLYCOSYLATED A1C: CPT

## 2024-07-05 PROCEDURE — 82043 UR ALBUMIN QUANTITATIVE: CPT

## 2024-07-05 PROCEDURE — 82570 ASSAY OF URINE CREATININE: CPT

## 2024-07-05 PROCEDURE — 80053 COMPREHEN METABOLIC PANEL: CPT

## 2024-07-26 ENCOUNTER — HOSPITAL ENCOUNTER (OUTPATIENT)
Dept: RADIOLOGY | Facility: HOSPITAL | Age: 64
Discharge: HOME | End: 2024-07-26
Payer: COMMERCIAL

## 2024-07-26 DIAGNOSIS — M25.552 PAIN IN LEFT HIP: ICD-10-CM

## 2024-07-26 DIAGNOSIS — M25.512 PAIN IN LEFT SHOULDER: ICD-10-CM

## 2024-07-26 PROCEDURE — 73502 X-RAY EXAM HIP UNI 2-3 VIEWS: CPT | Mod: RT

## 2024-07-26 PROCEDURE — 73221 MRI JOINT UPR EXTREM W/O DYE: CPT | Mod: LT

## 2024-08-02 ENCOUNTER — APPOINTMENT (OUTPATIENT)
Dept: CARDIOLOGY | Facility: CLINIC | Age: 64
End: 2024-08-02
Payer: COMMERCIAL

## 2024-08-02 VITALS
HEART RATE: 56 BPM | DIASTOLIC BLOOD PRESSURE: 76 MMHG | SYSTOLIC BLOOD PRESSURE: 134 MMHG | WEIGHT: 223.1 LBS | BODY MASS INDEX: 33.92 KG/M2

## 2024-08-02 DIAGNOSIS — I10 BENIGN ESSENTIAL HYPERTENSION: ICD-10-CM

## 2024-08-02 DIAGNOSIS — I25.10 CAD S/P PERCUTANEOUS CORONARY ANGIOPLASTY: ICD-10-CM

## 2024-08-02 DIAGNOSIS — Z98.61 CAD S/P PERCUTANEOUS CORONARY ANGIOPLASTY: ICD-10-CM

## 2024-08-02 DIAGNOSIS — Z87.891 FORMER CIGARETTE SMOKER: ICD-10-CM

## 2024-08-02 DIAGNOSIS — E78.2 MIXED HYPERLIPIDEMIA: ICD-10-CM

## 2024-08-02 DIAGNOSIS — Z85.46 HISTORY OF PROSTATE CANCER: ICD-10-CM

## 2024-08-02 DIAGNOSIS — E11.9 TYPE 2 DIABETES MELLITUS WITHOUT COMPLICATION, UNSPECIFIED WHETHER LONG TERM INSULIN USE (MULTI): ICD-10-CM

## 2024-08-02 PROCEDURE — 3075F SYST BP GE 130 - 139MM HG: CPT | Performed by: INTERNAL MEDICINE

## 2024-08-02 PROCEDURE — 3048F LDL-C <100 MG/DL: CPT | Performed by: INTERNAL MEDICINE

## 2024-08-02 PROCEDURE — 99214 OFFICE O/P EST MOD 30 MIN: CPT | Performed by: INTERNAL MEDICINE

## 2024-08-02 PROCEDURE — 1159F MED LIST DOCD IN RCRD: CPT | Performed by: INTERNAL MEDICINE

## 2024-08-02 PROCEDURE — 3044F HG A1C LEVEL LT 7.0%: CPT | Performed by: INTERNAL MEDICINE

## 2024-08-02 PROCEDURE — 3078F DIAST BP <80 MM HG: CPT | Performed by: INTERNAL MEDICINE

## 2024-08-02 PROCEDURE — 4010F ACE/ARB THERAPY RXD/TAKEN: CPT | Performed by: INTERNAL MEDICINE

## 2024-08-02 PROCEDURE — 1036F TOBACCO NON-USER: CPT | Performed by: INTERNAL MEDICINE

## 2024-08-02 PROCEDURE — 3062F POS MACROALBUMINURIA REV: CPT | Performed by: INTERNAL MEDICINE

## 2024-08-02 RX ORDER — TIZANIDINE 4 MG/1
4 TABLET ORAL 4 TIMES DAILY PRN
COMMUNITY

## 2024-08-02 RX ORDER — TRAMADOL HYDROCHLORIDE 50 MG/1
1 TABLET ORAL 3 TIMES DAILY
COMMUNITY
Start: 2024-06-14

## 2024-08-02 RX ORDER — NITROGLYCERIN 0.4 MG/1
0.4 TABLET SUBLINGUAL EVERY 5 MIN PRN
Qty: 25 TABLET | Refills: 5 | Status: SHIPPED | OUTPATIENT
Start: 2024-08-02

## 2024-08-02 RX ORDER — PREGABALIN 75 MG/1
75 CAPSULE ORAL 3 TIMES DAILY
COMMUNITY

## 2024-08-02 NOTE — PATIENT INSTRUCTIONS
Follow up office visit in 1 year.  Continue same medications/treatment.  Patient educated on proper medication use.  Patient educated on risk factor modification.  Please bring any lab results from other providers / physicians to your next appointment.    Please bring all medicines, vitamins and herbal supplements with you when you come to the office.    Prescriptions will not be filled unless you are compliant with your follow up appointments or have a follow up  appointment scheduled as per instruction of your physician.  Refills should be requested at the time of  Your visit.    Sherrie BULLOCK LPN, am scribing for and in the presence of Dr. Jose Cross MD, FACC

## 2024-08-02 NOTE — PROGRESS NOTES
CARDIOLOGY OFFICE VISIT      CHIEF COMPLAINT      HISTORY OF PRESENT ILLNESS  The patient states he has been doing well.  He denies chest discomfort or symptoms of myocardial ischemia.  He has not used nitroglycerin.  He denies dyspnea exertion.  He denies palpitations and syncope.  His most recent hemoglobin A1c was good at 5.2.  His most recent lipid profile was good with cholesterol 124, HDL 52, LDL 58, triglycerides 70.  I did discuss these results with the patient.        IMPRESSION:   1. Coronary artery disease, no angina.  2. Percutaneous coronary intervention with drug-eluting stent, mid right coronary 2018.  3. Essential hypertension.  4. Mixed hyperlipidemia.  5. Obesity  6. Former smoker.  7. Prostate cancer, treated with radiation seed implants  8. COVID-19 infection 2020  9. Diabetes Mellitus, Type 2. Diet Controlled     Please excuse any errors in grammar or translation related to this dictation. Voice recognition software was utilized to prepare this document.      Past Medical History  History reviewed. No pertinent past medical history.    Social History  Social History     Tobacco Use    Smoking status: Former     Current packs/day: 0.00     Types: Cigarettes     Start date:      Quit date:      Years since quittin.5    Smokeless tobacco: Never    Tobacco comments:     1 pack a week   Substance Use Topics    Alcohol use: Not Currently     Comment: occasionallyt, rare    Drug use: Never       Family History     Family History   Problem Relation Name Age of Onset    Heart disease Mother      Hyperlipidemia Mother      Hypertension Mother      Stroke Mother      Diabetes Mother          Allergies:  No Known Allergies     Outpatient Medications:  Current Outpatient Medications   Medication Instructions    atorvastatin (LIPITOR) 80 mg, oral, Nightly    clopidogrel (PLAVIX) 75 mg, oral, Daily    hydroxychloroquine (Plaquenil) 200 mg tablet 1 tablet, oral, 2 times daily, AS  DIRECTED    lisinopril 10 mg, oral, Daily    metoprolol succinate XL (TOPROL-XL) 25 mg, oral, Daily    nitroglycerin (NITROSTAT) 0.4 mg, sublingual, Every 5 min PRN    pregabalin (LYRICA) 75 mg, oral, 3 times daily    tamsulosin (FLOMAX) 0.4 mg, oral, Daily    tiZANidine (ZANAFLEX) 4 mg, oral, 4 times daily PRN    traMADol (Ultram) 50 mg tablet 1 tablet, oral, 3 times daily          REVIEW OF SYSTEMS  Review of Systems   All other systems reviewed and are negative.        VITALS  Vitals:    08/02/24 1039   BP: 134/76   Pulse: 56       PHYSICAL EXAM  Constitutional:       Appearance: Healthy appearance. Not in distress.   Eyes:      Conjunctiva/sclera: Conjunctivae normal.      Pupils: Pupils are equal, round, and reactive to light.   Neck:      Vascular: No JVR. JVD normal.   Pulmonary:      Effort: Pulmonary effort is normal.      Breath sounds: Normal breath sounds. No wheezing. No rhonchi. No rales.   Chest:      Chest wall: Not tender to palpatation.   Cardiovascular:      PMI at left midclavicular line. Normal rate. Regular rhythm. Normal S1. Normal S2.       Murmurs: There is a grade 1/6 systolic murmur. At base      No gallop.  No click. No rub.   Pulses:     Intact distal pulses.   Edema:     Peripheral edema absent.   Abdominal:      Tenderness: There is no abdominal tenderness.   Musculoskeletal: Normal range of motion.         General: No tenderness.      Cervical back: Normal range of motion. Skin:     General: Skin is warm and dry.   Neurological:      General: No focal deficit present.      Mental Status: Alert and oriented to person, place and time.           ASSESSMENT AND PLAN      [unfilled]

## 2024-08-02 NOTE — LETTER
August 2, 2024     Patient: Moses Gutierrez   YOB: 1960   Date of Visit: 8/2/2024       To Whom It May Concern:    Moses Gutierrez was seen in my clinic on 8/2/2024 at 10:30 am. Please excuse Moses for his absence from work on this day to make the appointment.    If you have any questions or concerns, please don't hesitate to call.         Sincerely,         Jose Cross MD        CC: No Recipients

## 2024-10-06 DIAGNOSIS — E78.5 HYPERLIPIDEMIA, UNSPECIFIED: ICD-10-CM

## 2024-10-07 RX ORDER — ATORVASTATIN CALCIUM 80 MG/1
80 TABLET, FILM COATED ORAL NIGHTLY
Qty: 90 TABLET | Refills: 3 | Status: SHIPPED | OUTPATIENT
Start: 2024-10-07

## 2024-10-07 NOTE — TELEPHONE ENCOUNTER
Received request for prescription refills for patient.   Patient follows with Dr. Jose Cross     Request is for Atorvastatin  Is patient currently on medication yes    Last OV 8/2/24  Next OV 8/1/25    Pended for signing and sent to provider

## 2024-12-04 ENCOUNTER — LAB (OUTPATIENT)
Dept: LAB | Facility: LAB | Age: 64
End: 2024-12-04
Payer: COMMERCIAL

## 2024-12-04 DIAGNOSIS — E11.22 TYPE 2 DIABETES MELLITUS WITH DIABETIC CHRONIC KIDNEY DISEASE: ICD-10-CM

## 2024-12-04 DIAGNOSIS — I12.9 HYPERTENSIVE CHRONIC KIDNEY DISEASE WITH STAGE 1 THROUGH STAGE 4 CHRONIC KIDNEY DISEASE, OR UNSPECIFIED CHRONIC KIDNEY DISEASE: ICD-10-CM

## 2024-12-04 DIAGNOSIS — B18.2 CHRONIC VIRAL HEPATITIS C (MULTI): Primary | ICD-10-CM

## 2024-12-04 DIAGNOSIS — E78.49 OTHER HYPERLIPIDEMIA: ICD-10-CM

## 2024-12-04 DIAGNOSIS — M10.49 OTHER SECONDARY GOUT, MULTIPLE SITES: ICD-10-CM

## 2024-12-04 LAB
ALBUMIN SERPL BCP-MCNC: 4.1 G/DL (ref 3.4–5)
ALP SERPL-CCNC: 71 U/L (ref 33–136)
ALT SERPL W P-5'-P-CCNC: 36 U/L (ref 10–52)
ANION GAP SERPL CALC-SCNC: 7 MMOL/L (ref 10–20)
AST SERPL W P-5'-P-CCNC: 21 U/L (ref 9–39)
BASOPHILS # BLD AUTO: 0.06 X10*3/UL (ref 0–0.1)
BASOPHILS NFR BLD AUTO: 1.3 %
BILIRUB SERPL-MCNC: 0.8 MG/DL (ref 0–1.2)
BUN SERPL-MCNC: 12 MG/DL (ref 6–23)
CALCIUM SERPL-MCNC: 8.4 MG/DL (ref 8.6–10.3)
CHLORIDE SERPL-SCNC: 108 MMOL/L (ref 98–107)
CHOLEST SERPL-MCNC: 116 MG/DL (ref 0–199)
CHOLESTEROL/HDL RATIO: 2.2
CO2 SERPL-SCNC: 31 MMOL/L (ref 21–32)
CREAT SERPL-MCNC: 1.18 MG/DL (ref 0.5–1.3)
EGFRCR SERPLBLD CKD-EPI 2021: 68 ML/MIN/1.73M*2
EOSINOPHIL # BLD AUTO: 0.25 X10*3/UL (ref 0–0.7)
EOSINOPHIL NFR BLD AUTO: 5.6 %
ERYTHROCYTE [DISTWIDTH] IN BLOOD BY AUTOMATED COUNT: 12.2 % (ref 11.5–14.5)
EST. AVERAGE GLUCOSE BLD GHB EST-MCNC: 105 MG/DL
GLUCOSE SERPL-MCNC: 110 MG/DL (ref 74–99)
HBA1C MFR BLD: 5.3 %
HCT VFR BLD AUTO: 42.9 % (ref 41–52)
HDLC SERPL-MCNC: 53.3 MG/DL
HGB BLD-MCNC: 13.9 G/DL (ref 13.5–17.5)
IMM GRANULOCYTES # BLD AUTO: 0.02 X10*3/UL (ref 0–0.7)
IMM GRANULOCYTES NFR BLD AUTO: 0.4 % (ref 0–0.9)
LDLC SERPL CALC-MCNC: 53 MG/DL
LYMPHOCYTES # BLD AUTO: 1.22 X10*3/UL (ref 1.2–4.8)
LYMPHOCYTES NFR BLD AUTO: 27.2 %
MCH RBC QN AUTO: 31.1 PG (ref 26–34)
MCHC RBC AUTO-ENTMCNC: 32.4 G/DL (ref 32–36)
MCV RBC AUTO: 96 FL (ref 80–100)
MONOCYTES # BLD AUTO: 0.48 X10*3/UL (ref 0.1–1)
MONOCYTES NFR BLD AUTO: 10.7 %
NEUTROPHILS # BLD AUTO: 2.45 X10*3/UL (ref 1.2–7.7)
NEUTROPHILS NFR BLD AUTO: 54.8 %
NON HDL CHOLESTEROL: 63 MG/DL (ref 0–149)
NRBC BLD-RTO: 0 /100 WBCS (ref 0–0)
PLATELET # BLD AUTO: 211 X10*3/UL (ref 150–450)
POTASSIUM SERPL-SCNC: 4.3 MMOL/L (ref 3.5–5.3)
PROT SERPL-MCNC: 5.9 G/DL (ref 6.4–8.2)
RBC # BLD AUTO: 4.47 X10*6/UL (ref 4.5–5.9)
SODIUM SERPL-SCNC: 142 MMOL/L (ref 136–145)
TRIGL SERPL-MCNC: 48 MG/DL (ref 0–149)
URATE SERPL-MCNC: 6.7 MG/DL (ref 4–7.5)
VLDL: 10 MG/DL (ref 0–40)
WBC # BLD AUTO: 4.5 X10*3/UL (ref 4.4–11.3)

## 2025-02-12 ENCOUNTER — HOSPITAL ENCOUNTER (EMERGENCY)
Facility: HOSPITAL | Age: 65
Discharge: HOME | End: 2025-02-13
Attending: STUDENT IN AN ORGANIZED HEALTH CARE EDUCATION/TRAINING PROGRAM
Payer: COMMERCIAL

## 2025-02-12 ENCOUNTER — APPOINTMENT (OUTPATIENT)
Dept: RADIOLOGY | Facility: HOSPITAL | Age: 65
End: 2025-02-12
Payer: COMMERCIAL

## 2025-02-12 DIAGNOSIS — S09.90XA CLOSED HEAD INJURY, INITIAL ENCOUNTER: ICD-10-CM

## 2025-02-12 DIAGNOSIS — S82.891A CLOSED FRACTURE OF RIGHT ANKLE, INITIAL ENCOUNTER: Primary | ICD-10-CM

## 2025-02-12 PROCEDURE — 99284 EMERGENCY DEPT VISIT MOD MDM: CPT | Mod: 25 | Performed by: STUDENT IN AN ORGANIZED HEALTH CARE EDUCATION/TRAINING PROGRAM

## 2025-02-12 PROCEDURE — 70450 CT HEAD/BRAIN W/O DYE: CPT

## 2025-02-12 PROCEDURE — 99285 EMERGENCY DEPT VISIT HI MDM: CPT | Mod: 25

## 2025-02-12 PROCEDURE — 73610 X-RAY EXAM OF ANKLE: CPT | Mod: RT

## 2025-02-12 PROCEDURE — 2500000001 HC RX 250 WO HCPCS SELF ADMINISTERED DRUGS (ALT 637 FOR MEDICARE OP): Performed by: STUDENT IN AN ORGANIZED HEALTH CARE EDUCATION/TRAINING PROGRAM

## 2025-02-12 PROCEDURE — 70450 CT HEAD/BRAIN W/O DYE: CPT | Performed by: RADIOLOGY

## 2025-02-12 PROCEDURE — 73610 X-RAY EXAM OF ANKLE: CPT | Mod: RIGHT SIDE | Performed by: RADIOLOGY

## 2025-02-12 RX ORDER — OXYCODONE AND ACETAMINOPHEN 5; 325 MG/1; MG/1
1 TABLET ORAL ONCE
Status: DISCONTINUED | OUTPATIENT
Start: 2025-02-12 | End: 2025-02-13 | Stop reason: HOSPADM

## 2025-02-12 RX ORDER — OXYCODONE AND ACETAMINOPHEN 5; 325 MG/1; MG/1
1 TABLET ORAL ONCE
Status: COMPLETED | OUTPATIENT
Start: 2025-02-12 | End: 2025-02-12

## 2025-02-12 RX ORDER — OXYCODONE AND ACETAMINOPHEN 5; 325 MG/1; MG/1
1 TABLET ORAL EVERY 6 HOURS PRN
Qty: 5 TABLET | Refills: 0 | Status: SHIPPED | OUTPATIENT
Start: 2025-02-12 | End: 2025-02-13 | Stop reason: SDUPTHER

## 2025-02-12 RX ADMIN — OXYCODONE HYDROCHLORIDE AND ACETAMINOPHEN 1 TABLET: 5; 325 TABLET ORAL at 21:38

## 2025-02-12 ASSESSMENT — PAIN SCALES - GENERAL: PAINLEVEL_OUTOF10: 6

## 2025-02-12 ASSESSMENT — PAIN DESCRIPTION - ORIENTATION: ORIENTATION: RIGHT

## 2025-02-12 ASSESSMENT — PAIN DESCRIPTION - LOCATION: LOCATION: ANKLE

## 2025-02-12 ASSESSMENT — LIFESTYLE VARIABLES
EVER FELT BAD OR GUILTY ABOUT YOUR DRINKING: NO
TOTAL SCORE: 0
EVER HAD A DRINK FIRST THING IN THE MORNING TO STEADY YOUR NERVES TO GET RID OF A HANGOVER: NO
HAVE YOU EVER FELT YOU SHOULD CUT DOWN ON YOUR DRINKING: NO
HAVE PEOPLE ANNOYED YOU BY CRITICIZING YOUR DRINKING: NO

## 2025-02-12 ASSESSMENT — COLUMBIA-SUICIDE SEVERITY RATING SCALE - C-SSRS
6. HAVE YOU EVER DONE ANYTHING, STARTED TO DO ANYTHING, OR PREPARED TO DO ANYTHING TO END YOUR LIFE?: NO
1. IN THE PAST MONTH, HAVE YOU WISHED YOU WERE DEAD OR WISHED YOU COULD GO TO SLEEP AND NOT WAKE UP?: NO
2. HAVE YOU ACTUALLY HAD ANY THOUGHTS OF KILLING YOURSELF?: NO

## 2025-02-12 ASSESSMENT — PAIN - FUNCTIONAL ASSESSMENT: PAIN_FUNCTIONAL_ASSESSMENT: 0-10

## 2025-02-13 ENCOUNTER — OFFICE VISIT (OUTPATIENT)
Dept: ORTHOPEDIC SURGERY | Facility: CLINIC | Age: 65
End: 2025-02-13
Payer: COMMERCIAL

## 2025-02-13 VITALS
TEMPERATURE: 98.1 F | BODY MASS INDEX: 30.06 KG/M2 | DIASTOLIC BLOOD PRESSURE: 69 MMHG | HEIGHT: 70 IN | HEART RATE: 55 BPM | OXYGEN SATURATION: 97 % | WEIGHT: 210 LBS | SYSTOLIC BLOOD PRESSURE: 139 MMHG | RESPIRATION RATE: 20 BRPM

## 2025-02-13 DIAGNOSIS — S82.841A BIMALLEOLAR ANKLE FRACTURE, RIGHT, CLOSED, INITIAL ENCOUNTER: Primary | ICD-10-CM

## 2025-02-13 DIAGNOSIS — G89.18 POST-OP PAIN: ICD-10-CM

## 2025-02-13 PROCEDURE — E0143 WALKER FOLDING WHEELED W/O S: HCPCS | Performed by: ORTHOPAEDIC SURGERY

## 2025-02-13 PROCEDURE — 99215 OFFICE O/P EST HI 40 MIN: CPT | Performed by: ORTHOPAEDIC SURGERY

## 2025-02-13 PROCEDURE — 99204 OFFICE O/P NEW MOD 45 MIN: CPT | Performed by: ORTHOPAEDIC SURGERY

## 2025-02-13 PROCEDURE — 4010F ACE/ARB THERAPY RXD/TAKEN: CPT | Performed by: ORTHOPAEDIC SURGERY

## 2025-02-13 PROCEDURE — 2500000001 HC RX 250 WO HCPCS SELF ADMINISTERED DRUGS (ALT 637 FOR MEDICARE OP): Performed by: STUDENT IN AN ORGANIZED HEALTH CARE EDUCATION/TRAINING PROGRAM

## 2025-02-13 RX ORDER — OXYCODONE AND ACETAMINOPHEN 5; 325 MG/1; MG/1
1 TABLET ORAL EVERY 8 HOURS PRN
Qty: 15 TABLET | Refills: 0 | Status: SHIPPED | OUTPATIENT
Start: 2025-02-13 | End: 2025-02-18

## 2025-02-13 ASSESSMENT — PAIN SCALES - GENERAL: PAINLEVEL_OUTOF10: 4

## 2025-02-13 NOTE — PROGRESS NOTES
History present illness: Patient presents today for evaluation of the right ankle.  He sustained a slip and fall while walking on the ice.  He was seen in the emergency department where x-rays demonstrated ankle fracture.  History of cardiac stenting last done in 2018.  He sees Dr. Crsos for cardiology.  Takes Plavix as a blood thinner.      Past medical history: The patient's past medical history, family history, social history, and review of systems were documented on the patient medical intake.  The updated data was reviewed in the electronic medical record.  History is negative except otherwise stated in history of present illness.        Physical examination:  General: Alert and oriented to person, place, and time.  No acute distress and breathing comfortably: Pleasant and cooperative with examination.  HEENT: Head is normocephalic and atraumatic.  Neck: Supple, no visible swelling.  Cardiovascular: No palpable tachycardia  Lungs: No audible wheezing or labored breathing  Abdomen: Nondistended.  Extremities: Evaluation of the right lower extremity finds well-padded well molded right short leg posterior splint with stirrup component.  Intact to sensory and motor to the toes.  Digits well-perfused.  Supple compartments as palpable about the splint.    Radiology: Bimalleolar fracture with components of nondisplaced small posterior malleolar fracture and minimally displaced right lateral malleolus fracture.      Assessment: Right ankle fracture, unstable pattern      Plan: Options were discussed.  We talked about operative and nonoperative strategies.  We talked about intraoperative techniques and postoperative protocols.  Recommendations were made for operative stabilization of lateral malleolus fracture.  Patient is agreeable.  Remain strict nonweightbearing to right lower extremity in the interim.  Walker to aid in ambulation.  Pain prescription given.  It was reinforced that he is to try and hold off on  narcotic use as this is designed to be used postoperatively.        Procedure:

## 2025-02-13 NOTE — ED PROVIDER NOTES
HPI   No chief complaint on file.      Patient is a 64-year-old male with past medical history of CAD, diabetes, hyperlipidemia, prostate cancer presents the ED for fall and right ankle pain.  Patient states that around 1800 tonight he had been getting out of his car when he slipped on ice falling flat on the ground.  He did hit his head but did not lose consciousness.  He reports that his head because of pain in his right ankle since that time.  He denies any vision changes, numbness, weakness.  He denies any neck or back pain.              Patient History   No past medical history on file.  Past Surgical History:   Procedure Laterality Date    CORONARY ANGIOPLASTY      OTHER SURGICAL HISTORY  2022    Colonoscopy    OTHER SURGICAL HISTORY  2022    Cardiac catheterization with stent placement    OTHER SURGICAL HISTORY  2022    Ganglion cyst excision    OTHER SURGICAL HISTORY  2022    Prostate surgery    OTHER SURGICAL HISTORY  2022    Prostate needle biopsy     Family History   Problem Relation Name Age of Onset    Heart disease Mother      Hyperlipidemia Mother      Hypertension Mother      Stroke Mother      Diabetes Mother       Social History     Tobacco Use    Smoking status: Former     Current packs/day: 0.00     Types: Cigarettes     Start date:      Quit date:      Years since quittin.1    Smokeless tobacco: Never    Tobacco comments:     1 pack a week   Substance Use Topics    Alcohol use: Not Currently     Comment: occasionallyt, rare    Drug use: Never       Physical Exam   ED Triage Vitals   Temp Pulse Resp BP   -- -- -- --      SpO2 Temp src Heart Rate Source Patient Position   -- -- -- --      BP Location FiO2 (%)     -- --       Physical Exam  Vitals and nursing note reviewed.   Constitutional:       General: He is not in acute distress.     Appearance: He is not toxic-appearing.   HENT:      Head: Normocephalic and atraumatic.      Nose: Nose normal.       Mouth/Throat:      Mouth: Mucous membranes are moist.   Eyes:      Pupils: Pupils are equal, round, and reactive to light.   Cardiovascular:      Rate and Rhythm: Normal rate and regular rhythm.      Heart sounds: No murmur heard.     No gallop.   Pulmonary:      Effort: Pulmonary effort is normal. No respiratory distress.      Breath sounds: Normal breath sounds. No wheezing.   Abdominal:      General: Abdomen is flat.      Palpations: Abdomen is soft.      Tenderness: There is no abdominal tenderness. There is no guarding.   Musculoskeletal:      Cervical back: Neck supple. No tenderness.      Comments: There is soft tissue swelling as well as tenderness to the medial lateral malleolus of the right ankle.  Range of motion of the right ankle limited secondary to pain.  No obvious deformity.  No tenderness of the proximal tibia or fibula.  No tenderness of the foot or toes.  No focal tenderness or abnormality of the remaining 3 extremities.   Skin:     General: Skin is warm and dry.   Neurological:      General: No focal deficit present.      Mental Status: He is alert and oriented to person, place, and time.      Cranial Nerves: No cranial nerve deficit.      Sensory: No sensory deficit.      Motor: No weakness.           ED Course & MDM   Diagnoses as of 02/12/25 2348   Closed fracture of right ankle, initial encounter   Closed head injury, initial encounter                 No data recorded                                 Medical Decision Making  Patient is nontoxic-appearing and in no distress.  He has tenderness over his right ankle but also did hit his head.  Will obtain a noncontrast CT of the head as well as neck for the right ankle.    CT of the head is negative for acute process.    X-ray does show a lateral malleolus fracture.  I discussed this with the patient at bedside.  He was placed in a splint here in the ED.  He already has crutches which he presented with.  We will give him information for the  orthopedic surgeon on-call to call first in the morning for the next available appointment.  Will give him a short prescription for Percocet for pain.  He can also take Tylenol or Motrin.  Patient states understanding and agreement with this plan was discharged in stable condition.        Procedure  Procedures     Audi Gerardo DO  02/12/25 0314

## 2025-02-13 NOTE — LETTER
February 13, 2025     Patient: Moses Gutierrez   YOB: 1960   Date of Visit: 2/13/2025       To Whom It May Concern:    It is my medical opinion that Moses Gutierrez should remain out of work until 02/21/25 .    If you have any questions or concerns, please don't hesitate to call.               Alfred Almanzar, DO

## 2025-02-17 ENCOUNTER — HOSPITAL ENCOUNTER (OUTPATIENT)
Dept: CARDIOLOGY | Facility: HOSPITAL | Age: 65
Discharge: HOME | End: 2025-02-17
Payer: COMMERCIAL

## 2025-02-17 ENCOUNTER — LAB (OUTPATIENT)
Dept: LAB | Facility: HOSPITAL | Age: 65
End: 2025-02-17
Payer: COMMERCIAL

## 2025-02-17 ENCOUNTER — TELEPHONE (OUTPATIENT)
Dept: ORTHOPEDIC SURGERY | Facility: CLINIC | Age: 65
End: 2025-02-17
Payer: COMMERCIAL

## 2025-02-17 DIAGNOSIS — Z01.818 ENCOUNTER FOR OTHER PREPROCEDURAL EXAMINATION: Primary | ICD-10-CM

## 2025-02-17 DIAGNOSIS — Z01.818 PRE-OP EXAM: ICD-10-CM

## 2025-02-17 LAB
ALBUMIN SERPL BCP-MCNC: 4.1 G/DL (ref 3.4–5)
ALP SERPL-CCNC: 67 U/L (ref 33–136)
ALT SERPL W P-5'-P-CCNC: 21 U/L (ref 10–52)
ANION GAP SERPL CALC-SCNC: 9 MMOL/L (ref 10–20)
AST SERPL W P-5'-P-CCNC: 13 U/L (ref 9–39)
ATRIAL RATE: 65 BPM
BASOPHILS # BLD AUTO: 0.05 X10*3/UL (ref 0–0.1)
BASOPHILS NFR BLD AUTO: 1.3 %
BILIRUB SERPL-MCNC: 1 MG/DL (ref 0–1.2)
BUN SERPL-MCNC: 13 MG/DL (ref 6–23)
CALCIUM SERPL-MCNC: 9.1 MG/DL (ref 8.6–10.3)
CHLORIDE SERPL-SCNC: 108 MMOL/L (ref 98–107)
CO2 SERPL-SCNC: 28 MMOL/L (ref 21–32)
CREAT SERPL-MCNC: 1.17 MG/DL (ref 0.5–1.3)
EGFRCR SERPLBLD CKD-EPI 2021: 69 ML/MIN/1.73M*2
EOSINOPHIL # BLD AUTO: 0.11 X10*3/UL (ref 0–0.7)
EOSINOPHIL NFR BLD AUTO: 2.9 %
ERYTHROCYTE [DISTWIDTH] IN BLOOD BY AUTOMATED COUNT: 11.9 % (ref 11.5–14.5)
GLUCOSE SERPL-MCNC: 119 MG/DL (ref 74–99)
HCT VFR BLD AUTO: 37.4 % (ref 41–52)
HGB BLD-MCNC: 12.5 G/DL (ref 13.5–17.5)
IMM GRANULOCYTES # BLD AUTO: 0 X10*3/UL (ref 0–0.7)
IMM GRANULOCYTES NFR BLD AUTO: 0 % (ref 0–0.9)
LYMPHOCYTES # BLD AUTO: 1.24 X10*3/UL (ref 1.2–4.8)
LYMPHOCYTES NFR BLD AUTO: 33.2 %
MCH RBC QN AUTO: 30.8 PG (ref 26–34)
MCHC RBC AUTO-ENTMCNC: 33.4 G/DL (ref 32–36)
MCV RBC AUTO: 92 FL (ref 80–100)
MONOCYTES # BLD AUTO: 0.39 X10*3/UL (ref 0.1–1)
MONOCYTES NFR BLD AUTO: 10.4 %
NEUTROPHILS # BLD AUTO: 1.95 X10*3/UL (ref 1.2–7.7)
NEUTROPHILS NFR BLD AUTO: 52.2 %
NRBC BLD-RTO: 0 /100 WBCS (ref 0–0)
P AXIS: 62 DEGREES
P OFFSET: 195 MS
P ONSET: 126 MS
PLATELET # BLD AUTO: 205 X10*3/UL (ref 150–450)
POTASSIUM SERPL-SCNC: 4.5 MMOL/L (ref 3.5–5.3)
PR INTERVAL: 172 MS
PROT SERPL-MCNC: 6.1 G/DL (ref 6.4–8.2)
Q ONSET: 212 MS
QRS COUNT: 10 BEATS
QRS DURATION: 110 MS
QT INTERVAL: 382 MS
QTC CALCULATION(BAZETT): 397 MS
QTC FREDERICIA: 392 MS
R AXIS: -34 DEGREES
RBC # BLD AUTO: 4.06 X10*6/UL (ref 4.5–5.9)
SODIUM SERPL-SCNC: 140 MMOL/L (ref 136–145)
T AXIS: 27 DEGREES
T OFFSET: 403 MS
VENTRICULAR RATE: 65 BPM
WBC # BLD AUTO: 3.7 X10*3/UL (ref 4.4–11.3)

## 2025-02-17 PROCEDURE — 93010 ELECTROCARDIOGRAM REPORT: CPT | Performed by: INTERNAL MEDICINE

## 2025-02-17 PROCEDURE — 85025 COMPLETE CBC W/AUTO DIFF WBC: CPT

## 2025-02-17 PROCEDURE — 93005 ELECTROCARDIOGRAM TRACING: CPT

## 2025-02-17 PROCEDURE — 80053 COMPREHEN METABOLIC PANEL: CPT

## 2025-02-17 PROCEDURE — 36415 COLL VENOUS BLD VENIPUNCTURE: CPT

## 2025-02-17 NOTE — TELEPHONE ENCOUNTER
Patient was asking where did he have to go for blood work and the ECG and I let him know that he's in the system for all  facilities but he would like to go outside [facility] he would have to come and grab the Order to take to that specific facility.  Patient understood

## 2025-02-18 RX ORDER — OXYCODONE AND ACETAMINOPHEN 5; 325 MG/1; MG/1
1 TABLET ORAL EVERY 8 HOURS PRN
Qty: 20 TABLET | Refills: 0 | Status: SHIPPED | OUTPATIENT
Start: 2025-02-18 | End: 2025-02-25

## 2025-02-19 PROCEDURE — 27829 TREAT LOWER LEG JOINT: CPT | Performed by: PHYSICIAN ASSISTANT

## 2025-02-19 PROCEDURE — 27829 TREAT LOWER LEG JOINT: CPT | Performed by: ORTHOPAEDIC SURGERY

## 2025-02-19 PROCEDURE — 27792 TREATMENT OF ANKLE FRACTURE: CPT | Performed by: ORTHOPAEDIC SURGERY

## 2025-02-25 ENCOUNTER — APPOINTMENT (OUTPATIENT)
Dept: ORTHOPEDIC SURGERY | Facility: CLINIC | Age: 65
End: 2025-02-25
Payer: COMMERCIAL

## 2025-03-04 ENCOUNTER — OFFICE VISIT (OUTPATIENT)
Dept: ORTHOPEDIC SURGERY | Facility: CLINIC | Age: 65
End: 2025-03-04
Payer: MEDICARE

## 2025-03-04 ENCOUNTER — HOSPITAL ENCOUNTER (OUTPATIENT)
Dept: RADIOLOGY | Facility: CLINIC | Age: 65
Discharge: HOME | End: 2025-03-04
Payer: MEDICARE

## 2025-03-04 DIAGNOSIS — S82.841A BIMALLEOLAR ANKLE FRACTURE, RIGHT, CLOSED, INITIAL ENCOUNTER: ICD-10-CM

## 2025-03-04 PROCEDURE — 73610 X-RAY EXAM OF ANKLE: CPT | Mod: RT

## 2025-03-04 PROCEDURE — 4010F ACE/ARB THERAPY RXD/TAKEN: CPT | Performed by: ORTHOPAEDIC SURGERY

## 2025-03-04 PROCEDURE — 99024 POSTOP FOLLOW-UP VISIT: CPT | Performed by: ORTHOPAEDIC SURGERY

## 2025-03-04 PROCEDURE — 99211 OFF/OP EST MAY X REQ PHY/QHP: CPT | Performed by: ORTHOPAEDIC SURGERY

## 2025-03-04 RX ORDER — HYDROCODONE BITARTRATE AND ACETAMINOPHEN 5; 325 MG/1; MG/1
1 TABLET ORAL EVERY 8 HOURS PRN
Qty: 10 TABLET | Refills: 0 | Status: SHIPPED | OUTPATIENT
Start: 2025-03-04 | End: 2025-03-08

## 2025-03-04 NOTE — PROGRESS NOTES
3/4/2025    Chief Complaint   Patient presents with    Right Ankle - Post-op Visit     ORIF Rt lateral malleolus fx  DOS: 2/19/25  XRAY TODAY       History of Present Illness:  Patient Moses Gutierrez , 64 y.o. male, presents today, 3/4/2025, for evaluation of right  ankle   bimalleolar facture syndesmotic fixation, 2 weeks out from ORIF.   He has been immobilized in postop splint.  He states he has been compliant with nonweightbearing restrictions.  Denies any fevers chills or constitutional symptoms.       Review of Systems:   GENERAL: Negative  GI: Negative  MUSCULOSKELETAL: See HPI  SKIN: Negative  NEURO:  Negative     Physical Exam:  GENERAL:  Alert and oriented to person, place, and time.  No acute distress and breathing comfortably; pleasant and cooperative with the examination.  HEENT:  Head is normocephalic and atraumatic.  NECK:  Supple, no visible swelling.  CARDIOVASCULAR:  No palpable tachycardia.  LUNGS:  No audible wheezing or labored breathing.  ABDOMEN:  Nondistended.  Extremities: Evaluation of the right lower extremity finds the patient to have a palpable dorsalis pedis pulse to palpation with brisk capillary refill through the toes. The patient has intact sensorium to tibial, sural, saphenous, deep and superficial peroneal nerves to light touch. EHL, FHL, dorsiflexion and plantarflexion are intact to motor. No lymphedema or lymphatic streaking. No signs of deep vein thrombosis. No open wounds. No signs of infection. Supple compartments to the thigh, leg and foot.  Surgical incision is healing well.  He has intact dorsiflexion plantarflexion.  Calf is soft nontender, supple on exam.     Imaging/Test Results:  3 views the ankle show evidence of lateral malleolus fracture status post ORIF with syndesmotic screw fixation.  Adequate alignment all planes.  No evidence operative emigration.     Assessment:  Right ankle lateral malleolus fracture/bimalleolar fracture equivalent, 2 weeks out from ORIF.      Plan:  Staples removed in the office today.  Continue strict nonweightbearing the right lower extremity.  Transition removal boot coming out of daily for hygiene and gentle motion recovery across the ankle.  Follow-up again in 6 weeks for repeat clinical and radiographic exam, x-rays 3 views of the right ankle upon return.  Will discuss moving forward with scheduling for hardware removal of syndesmotic screw at that time.  All questions answered today's visit.    Billie Lai PA-C

## 2025-04-03 ENCOUNTER — OUTSIDE SERVICES (OUTPATIENT)
Dept: NEUROLOGY | Age: 65
End: 2025-04-03
Payer: COMMERCIAL

## 2025-04-03 ENCOUNTER — HOSPITAL ENCOUNTER (OUTPATIENT)
Dept: NEUROLOGY | Facility: HOSPITAL | Age: 65
Discharge: HOME | End: 2025-04-03
Payer: COMMERCIAL

## 2025-04-03 DIAGNOSIS — G56.03 BILATERAL CARPAL TUNNEL SYNDROME: Primary | ICD-10-CM

## 2025-04-03 DIAGNOSIS — M54.89 OTHER DORSALGIA: ICD-10-CM

## 2025-04-03 DIAGNOSIS — M13.89 OTHER SPECIFIED ARTHRITIS, MULTIPLE SITES: ICD-10-CM

## 2025-04-03 DIAGNOSIS — R20.2 PARESTHESIA OF SKIN: ICD-10-CM

## 2025-04-03 PROCEDURE — 95913 NRV CNDJ TEST 13/> STUDIES: CPT

## 2025-04-03 PROCEDURE — 95913 NRV CNDJ TEST 13/> STUDIES: CPT | Performed by: PSYCHIATRY & NEUROLOGY

## 2025-04-03 PROCEDURE — 95886 MUSC TEST DONE W/N TEST COMP: CPT | Performed by: PSYCHIATRY & NEUROLOGY

## 2025-04-17 ENCOUNTER — OFFICE VISIT (OUTPATIENT)
Dept: ORTHOPEDIC SURGERY | Facility: CLINIC | Age: 65
End: 2025-04-17
Payer: COMMERCIAL

## 2025-04-17 ENCOUNTER — HOSPITAL ENCOUNTER (OUTPATIENT)
Dept: RADIOLOGY | Facility: CLINIC | Age: 65
Discharge: HOME | End: 2025-04-17
Payer: COMMERCIAL

## 2025-04-17 DIAGNOSIS — S82.841A BIMALLEOLAR ANKLE FRACTURE, RIGHT, CLOSED, INITIAL ENCOUNTER: ICD-10-CM

## 2025-04-17 PROCEDURE — 99214 OFFICE O/P EST MOD 30 MIN: CPT | Performed by: ORTHOPAEDIC SURGERY

## 2025-04-17 PROCEDURE — 73610 X-RAY EXAM OF ANKLE: CPT | Mod: RT

## 2025-04-17 NOTE — PROGRESS NOTES
4/17/2025    Chief Complaint   Patient presents with    Right Ankle - Follow-up     ORIF Rt lateral malleolus fx  DOS: 2/19/25  Xrays today       History of Present Illness:  Patient Moses Gutierrez , 64 y.o. male, presents today, 4/17/2025, for evaluation of right  ankle   bimalleolar equivalent fracture, 8 weeks out from ORIF with syndesmotic fixation.  He is states he has done okay since last visit but overall is having continued persistence of pain and discomfort to the ankle especially stiffness.  He reports occasional pain that shoots up the lower leg towards the knee.  He describes stiffness to range of motion with dorsiflexion and plantarflexion and substantial pain with any efforts to inversion or eversion.  He has been compliant with boot immobilization, this is in good repair .         Review of Systems:   GENERAL: Negative  GI: Negative  MUSCULOSKELETAL: See HPI  SKIN: Negative  NEURO:  Negative     Physical Exam:  GENERAL:  Alert and oriented to person, place, and time.  No acute distress and breathing comfortably; pleasant and cooperative with the examination.  HEENT:  Head is normocephalic and atraumatic.  NECK:  Supple, no visible swelling.  CARDIOVASCULAR:  No palpable tachycardia.  LUNGS:  No audible wheezing or labored breathing.  ABDOMEN:  Nondistended.  Extremities: Evaluation of the right lower extremity finds the patient to have a palpable dorsalis pedis pulse to palpation with brisk capillary refill through the toes. The patient has intact sensorium to tibial, sural, saphenous, deep and superficial peroneal nerves to light touch. EHL, FHL, dorsiflexion and plantarflexion are intact to motor. No lymphedema or lymphatic streaking. No signs of deep vein thrombosis. No open wounds. No signs of infection. Supple compartments to the thigh, leg and foot.  Calf is soft, nontender, and supple on exam.  He is not exquisitely tender over the fracture site of the lateral malleolus.  He only has about 10  degrees of dorsiflexion and about 15 degrees of plantarflexion of the foot.     Imaging/Test Results:  3 views of the ankle show evidence of healing lateral malleolus/bimalleolar equivalent fracture with continued good alignment.  No evidence of hardware failure migration.  Increased healing callus ration is noted across the fracture zone.     Assessment:  Right lateral malleolus/bimalleolar equivalent fracture, 8 weeks out from ORIF with retained syndesmotic screw fixation.     Plan:  We recommend continue nonweightbearing.  We will plan for surgery to remove the indwelling syndesmotic screw in about 2 weeks.  Will help coordinate this.  Stop his Plavix 5 days before surgery and resume on postop day 1.  Follow-up with our office about 10 to 14 days postop.  All questions answered today's visit.    In a face to face encounter, I performed a history and physical examination, discussed pertinent diagnostic studies if indicated, and discussed diagnosis and management strategies with both the patient and the mid-level provider. I reviewed the mid-level's note and agree with the documented findings and plan of care.  Patient presents today for evaluation of the right ankle.  X-rays demonstrate healing and stability of hardware.  Physical exam shows residual swelling and stiffness to the ankle but overall looks good.  Treatment options were discussed regarding the indwelling syndesmotic screw.  He was given the option to keep the screw.  He understands that it could break and become more painful.  After thoughtful discussion patient elects to proceed forth with removal of the right ankle indwelling syndesmotic screw.  This will be done in approximately 2 weeks.

## 2025-04-23 ENCOUNTER — LAB (OUTPATIENT)
Dept: LAB | Facility: HOSPITAL | Age: 65
End: 2025-04-23
Payer: COMMERCIAL

## 2025-04-23 DIAGNOSIS — Z01.818 ENCOUNTER FOR OTHER PREPROCEDURAL EXAMINATION: Primary | ICD-10-CM

## 2025-04-23 LAB
ALBUMIN SERPL BCP-MCNC: 4.4 G/DL (ref 3.4–5)
ALP SERPL-CCNC: 67 U/L (ref 33–136)
ALT SERPL W P-5'-P-CCNC: 32 U/L (ref 10–52)
ANION GAP SERPL CALC-SCNC: 9 MMOL/L (ref 10–20)
AST SERPL W P-5'-P-CCNC: 20 U/L (ref 9–39)
BASOPHILS # BLD AUTO: 0.04 X10*3/UL (ref 0–0.1)
BASOPHILS NFR BLD AUTO: 1.1 %
BILIRUB SERPL-MCNC: 1 MG/DL (ref 0–1.2)
BUN SERPL-MCNC: 14 MG/DL (ref 6–23)
CALCIUM SERPL-MCNC: 9.4 MG/DL (ref 8.6–10.3)
CHLORIDE SERPL-SCNC: 103 MMOL/L (ref 98–107)
CO2 SERPL-SCNC: 29 MMOL/L (ref 21–32)
CREAT SERPL-MCNC: 1.27 MG/DL (ref 0.5–1.3)
EGFRCR SERPLBLD CKD-EPI 2021: 62 ML/MIN/1.73M*2
EOSINOPHIL # BLD AUTO: 0.11 X10*3/UL (ref 0–0.7)
EOSINOPHIL NFR BLD AUTO: 3.1 %
ERYTHROCYTE [DISTWIDTH] IN BLOOD BY AUTOMATED COUNT: 12.2 % (ref 11.5–14.5)
GLUCOSE SERPL-MCNC: 171 MG/DL (ref 74–99)
HCT VFR BLD AUTO: 40.2 % (ref 41–52)
HGB BLD-MCNC: 13.6 G/DL (ref 13.5–17.5)
IMM GRANULOCYTES # BLD AUTO: 0.01 X10*3/UL (ref 0–0.7)
IMM GRANULOCYTES NFR BLD AUTO: 0.3 % (ref 0–0.9)
LYMPHOCYTES # BLD AUTO: 1.22 X10*3/UL (ref 1.2–4.8)
LYMPHOCYTES NFR BLD AUTO: 34.2 %
MCH RBC QN AUTO: 30.8 PG (ref 26–34)
MCHC RBC AUTO-ENTMCNC: 33.8 G/DL (ref 32–36)
MCV RBC AUTO: 91 FL (ref 80–100)
MONOCYTES # BLD AUTO: 0.36 X10*3/UL (ref 0.1–1)
MONOCYTES NFR BLD AUTO: 10.1 %
NEUTROPHILS # BLD AUTO: 1.83 X10*3/UL (ref 1.2–7.7)
NEUTROPHILS NFR BLD AUTO: 51.2 %
NRBC BLD-RTO: 0 /100 WBCS (ref 0–0)
PLATELET # BLD AUTO: 216 X10*3/UL (ref 150–450)
POTASSIUM SERPL-SCNC: 4.5 MMOL/L (ref 3.5–5.3)
PROT SERPL-MCNC: 6.4 G/DL (ref 6.4–8.2)
RBC # BLD AUTO: 4.42 X10*6/UL (ref 4.5–5.9)
SODIUM SERPL-SCNC: 136 MMOL/L (ref 136–145)
WBC # BLD AUTO: 3.6 X10*3/UL (ref 4.4–11.3)

## 2025-04-23 PROCEDURE — 85025 COMPLETE CBC W/AUTO DIFF WBC: CPT

## 2025-04-23 PROCEDURE — 84075 ASSAY ALKALINE PHOSPHATASE: CPT

## 2025-04-23 PROCEDURE — 36415 COLL VENOUS BLD VENIPUNCTURE: CPT

## 2025-04-29 DIAGNOSIS — E78.5 HYPERLIPIDEMIA, UNSPECIFIED: ICD-10-CM

## 2025-04-29 RX ORDER — ATORVASTATIN CALCIUM 80 MG/1
80 TABLET, FILM COATED ORAL NIGHTLY
Qty: 90 TABLET | Refills: 3 | Status: SHIPPED | OUTPATIENT
Start: 2025-04-29

## 2025-04-29 NOTE — TELEPHONE ENCOUNTER
Received request for prescription refills for patient.   Patient follows with Dr. Jose Cross MD     Request is for refill  Is patient currently on medication yes    Last OV 8/2/24   Next OV 8/1/25    Pended for signing and sent to provider

## 2025-05-01 DIAGNOSIS — G89.18 POST-OP PAIN: Primary | ICD-10-CM

## 2025-05-01 RX ORDER — HYDROCODONE BITARTRATE AND ACETAMINOPHEN 5; 325 MG/1; MG/1
1 TABLET ORAL EVERY 8 HOURS PRN
Qty: 15 TABLET | Refills: 0 | Status: SHIPPED | OUTPATIENT
Start: 2025-05-01 | End: 2025-05-06

## 2025-05-02 PROCEDURE — 20680 REMOVAL OF IMPLANT DEEP: CPT | Performed by: ORTHOPAEDIC SURGERY

## 2025-05-13 ENCOUNTER — HOSPITAL ENCOUNTER (OUTPATIENT)
Dept: RADIOLOGY | Facility: CLINIC | Age: 65
Discharge: HOME | End: 2025-05-13
Payer: COMMERCIAL

## 2025-05-13 ENCOUNTER — OFFICE VISIT (OUTPATIENT)
Dept: ORTHOPEDIC SURGERY | Facility: CLINIC | Age: 65
End: 2025-05-13
Payer: COMMERCIAL

## 2025-05-13 DIAGNOSIS — S82.841A BIMALLEOLAR ANKLE FRACTURE, RIGHT, CLOSED, INITIAL ENCOUNTER: ICD-10-CM

## 2025-05-13 PROCEDURE — 1159F MED LIST DOCD IN RCRD: CPT | Performed by: ORTHOPAEDIC SURGERY

## 2025-05-13 PROCEDURE — 99212 OFFICE O/P EST SF 10 MIN: CPT | Performed by: ORTHOPAEDIC SURGERY

## 2025-05-13 PROCEDURE — 99024 POSTOP FOLLOW-UP VISIT: CPT | Performed by: ORTHOPAEDIC SURGERY

## 2025-05-13 PROCEDURE — 1036F TOBACCO NON-USER: CPT | Performed by: ORTHOPAEDIC SURGERY

## 2025-05-13 PROCEDURE — 4010F ACE/ARB THERAPY RXD/TAKEN: CPT | Performed by: ORTHOPAEDIC SURGERY

## 2025-05-13 PROCEDURE — 73610 X-RAY EXAM OF ANKLE: CPT | Mod: RT

## 2025-05-13 NOTE — PROGRESS NOTES
"    5/13/2025    Chief Complaint   Patient presents with    Right Ankle - Post-op     Rt ankle hardware removal 5/2/2025       History of Present Illness:  Patient Moses Gutierrez , 64 y.o. male, presents today, 5/13/2025, for evaluation of right ankle hardware removal, 2 weeks postop.  He states he is done somewhat poorly in the interim since surgery as he feels the ankle is still stiff and sore.  He has been apprehensive to weight-bear on it.  He states he has been doing this \"a little bit\" but using a cane and boot for ambulation.  No fevers chills or constitutional symptoms reported.         Review of Systems:   GENERAL: Negative  GI: Negative  MUSCULOSKELETAL: See HPI  SKIN: Negative  NEURO:  Negative     Physical Exam:  GENERAL:  Alert and oriented to person, place, and time.  No acute distress and breathing comfortably; pleasant and cooperative with the examination.  HEENT:  Head is normocephalic and atraumatic.  NECK:  Supple, no visible swelling.  CARDIOVASCULAR:  No palpable tachycardia.  LUNGS:  No audible wheezing or labored breathing.  ABDOMEN:  Nondistended.  Extremities: Evaluation of the right lower extremity finds the patient to have a palpable dorsalis pedis pulse to palpation with brisk capillary refill through the toes. The patient has intact sensorium to tibial, sural, saphenous, deep and superficial peroneal nerves to light touch. EHL, FHL, dorsiflexion and plantarflexion are intact to motor. No lymphedema or lymphatic streaking. No signs of deep vein thrombosis. No open wounds. No signs of infection. Supple compartments to the thigh, leg and foot.  Surgical incision is healed.  He can dorsiflex about 15 degrees.  No tenderness palpation of the lateral malleolus fracture.     Imaging/Test Results:  3 views of the right ankle show evidence of lateral malleolus fracture with removal of indwelling syndesmotic screw fixation.  Continued good alignment all planes.     Assessment:  Right ankle hardware " removal syndesmotic screw fixation, 2 weeks postop.     Plan:  Sutures removed in office today.  Patient can continue weight-bear as tolerated.  We recommend he wean from crutch and boot.  Wean formal therapy referral to work on endurance and strengthening, gait and balance training.  Follow-up with our office in the 8 weeks for repeat clinical and radiographic exam, x-rays 3 views right ankle upon return.  All questions answered today's visit.      Billie Lai PA-C

## 2025-06-04 ENCOUNTER — EVALUATION (OUTPATIENT)
Dept: PHYSICAL THERAPY | Facility: CLINIC | Age: 65
End: 2025-06-04
Payer: COMMERCIAL

## 2025-06-04 DIAGNOSIS — S82.841D BIMALLEOLAR ANKLE FRACTURE, RIGHT, CLOSED, WITH ROUTINE HEALING, SUBSEQUENT ENCOUNTER: ICD-10-CM

## 2025-06-04 PROCEDURE — 97110 THERAPEUTIC EXERCISES: CPT | Mod: GP

## 2025-06-04 PROCEDURE — 97161 PT EVAL LOW COMPLEX 20 MIN: CPT | Mod: GP

## 2025-06-04 ASSESSMENT — PATIENT HEALTH QUESTIONNAIRE - PHQ9
1. LITTLE INTEREST OR PLEASURE IN DOING THINGS: NOT AT ALL
2. FEELING DOWN, DEPRESSED OR HOPELESS: NOT AT ALL
SUM OF ALL RESPONSES TO PHQ9 QUESTIONS 1 AND 2: 0

## 2025-06-04 ASSESSMENT — ENCOUNTER SYMPTOMS
LOSS OF SENSATION IN FEET: 0
OCCASIONAL FEELINGS OF UNSTEADINESS: 1
DEPRESSION: 0

## 2025-06-04 NOTE — PROGRESS NOTES
"Patient Name: Moses Gutierrez  MRN: [id number]  Time Calculation  Start Time: 1135  Stop Time: 1215  Time Calculation (min): 40 min  PT Evaluation Time Entry  PT Evaluation (Low) Time Entry: 30  PT Therapeutic Procedures Time Entry  Therapeutic Exercise Time Entry: 10        Current Problem  1. Bimalleolar ankle fracture, right, closed, with routine healing, subsequent encounter  Referral to Physical Therapy    Follow Up In Physical Therapy        Insurance    Insurance reviewed   Visit number: 1  Approved number of visits: 60  [employer] 60V PT OT LIS YR COPAY 25   DED (MET ) COVERAGE 90 OOP 3000([id number]) NO AUTH REQ       Subjective     General: Pt is a 65 y.o male s/p R ankle ORIF removal on 25 s/t to bimalleolar fx on 25. Pt is presenting to clinic with c/o R ankle and L knee pain along with general weakness and unsteadiness. L knee pain is described as throb  R ankle pain is described as sharp/shooting. Notes LBP L3-L5 and was seeing pain management prior to fall. Notes pressure with active DF when leg extended  Notes R ankle is waking him up 1-2x/night s/t to ankle discomfort  Side-sleeper primarily L>R. Currently having difficulty with walking, stair climbing, weightbearing and active ankle movement. Main goal with therapy at this time is to increase general strength, ROM, and weightbearing tolerance to return to work.    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    Stair Navigation: Step-to ascent/descent with L LE favoring  Home set-up: 2 story home with bed/bath upstairs 1 HR, 5 VENKAT (1 HR)    Precautions:  WBAT  MD orders 25: \"Patient can continue weight-bear as tolerated. We recommend he wean from crutch and boot.\"  Pain:  Current:  3/10 R ankle, 6-7/10 L knee  High: 10/10  Low: 1/10  Av-2/10     Aggravating Factors: Pivoting, turning, stair climbing  Alleviating Factors: Rest, non-weightbearing    Reviewed medical " "screening form with pt and medical screening assessed    Imaging:   X-Ray R ankle 5/13/25:  \"FINDINGS:  X-rays of the right ankle demonstrate healed lateral malleolus  fracture. Recent hardware removal of syndesmotic screw. Stable  alignment of the ankle. No hardware failure or migration.\"  Objective   Presents to session today with unilateral crutch and extremely favored L LE with gait and STS this date. Almost no weight placed through R LE during STS.    Knee Musculoskeletal Exam  Gait    Limp: right    Assistive device: crutches    Palpation    Right      Tenderness: none      Left      Tenderness: present          Lateral joint line: moderate          LCL: mild          MCL: moderate          Medial joint line: moderate          Patella: mild          Patellar tendon: mild          Pes anserinus: mild          Quadriceps tendon: mild      Range of Motion    Right      Right knee range of motion is normal and full.      Left      Left knee range of motion is normal and full.      Strength    Right      Extension: 4+/5.       Flexion: 4+/5.     Left      Extension: 4-/5. Extension is affected by pain.       Flexion: 4-/5. Flexion is affected by pain.      Instability    Right      Instability signs: none - stable    Left      Instability signs: none - stable    Neurovascular    Right      Right knee neurovascular exam is normal.      Left      Left knee neurovascular exam is normal.      Special Signs    Right      Right knee special signs are normal.    Left      Left knee special signs are normal.    Hip Musculoskeletal Exam  Gait    Limp: right    Assistive device: crutches    Palpation    Right        Right hip palpation is normal.    Left        Left hip palpation is normal.    Range of Motion    Right      Right hip range of motion is within functional limits.     Left      Left hip range of motion is within functional limits.     Strength    Right      Extension: 4-/5.       Flexion: 4-/5. Flexion is " affected by pain.       Internal rotation: 4/5.       External rotation: 4/5.       Adduction: 4+/5.       Abduction: 4/5.     Left      Extension: 4-/5.       Flexion: 4-/5. Flexion is affected by pain.       Internal rotation: 4/5.       External rotation: 4/5.       Adduction: 4+/5.       Abduction: 4/5.     Neurovascular    Right        Right hip neurovascular exam is normal.    Left        Left hip neurovascular exam is normal.    Special Tests    Right        Right hip special tests are normal.      Impingement test: negative    Left        Left hip special tests are normal.      Impingement test: negative     Foot/Ankle Musculoskeletal Exam  Gait    Limp: right    Assistive device: crutches    Range of Motion    Right      Active Dorsiflexion: 10      Active Plantar Flexion: 20      Active Inversion: 10      Active Eversion: 5    Left      Left foot/ankle range of motion is normal and full.        Active Dorsiflexion: 20      Active Plantar Flexion: 40      Active Inversion: 20      Active Eversion: 20    Strength    Right      Tibialis anterior: 4-/5.       Extensor hallucis longus: 4+/5.       Flexor hallucis longus: 4+/5.       Gastroc/soleus: 4-/5.       Tibialis posterior: 4-/5. Tibialis posterior is affected by pain.       Peroneals: 4-/5. Peroneals are affected by pain.     Left      Tibialis anterior: 4+/5.       Extensor hallucis longus: 4+/5.       Flexor hallucis longus: 4+/5.       Gastroc/soleus: 4+/5.       Peroneals: 4+/5.     Neurovascular    Right      Right foot/ankle neurovascular exam is normal.      Left      Left foot/ankle neurovascular exam is normal.      Special Tests    Right      Anterior drawer test: negative      Talar tilt stress test: negative        Dunn's test: negative        Single heel rise: positive and with pain        Double heel rise: positive and with pain      Left      Anterior drawer test: negative      Talar tilt stress test: negative        Dunn's test:  negative      SLS R/L: 2.1 sec/ 3.4 sec (demonstrated significant L knee bend d/t intolerance to weight bearing on fully extended L knee)    Outcome Measures:  LEFS:  17/80     Treatment: See HEP below  Seated PF stretch x3 :30 sec with black TB  Seated ankle EV vs YTB 2x12  Seated clamshell vs GTB 2x12  Ankle circles on compliant surface/pillow x10 CCW/CW ea    EDUCATION/HEP:  Pt educated on the importance of improving R ankle tolerance to weightbearing and general strength and stability to improve L knee pain. He was also encouraged to seek assistance today at orthopedic walk in clinic to obtain MD work restriction d/t unreadiness to return to work. Pt was also educated on the importance of improving weightbearing tolerance throughout day by increasing interval walking with more weight on R than currently. Pt was educated on POC, expected timeline and treatments. Pt acknowledged good understanding and was in agreement to all above information.      Assessment:   Pt is a 65 y.o male coming to clinic with primary complaint of R ankle pain s/t to ORIF removal 5/2/25 and bimalleolar fracture on 2/12/25 . On exam is demonstrating  decreased confidence with weightbearing, stability, strength and ROM globally at ankle . These deficits have lead to functional impairments with stair climbing, walking, prolonged standing, and lifting duties thus limiting participation at home and work requiring modifications and limitations in all activities . Recommend skilled PT to address the aforementioned deficits and allow pt to restore PLOF and maximize functional capacity. Anticipate fair to good prognosis given age , attitude towards therapy, and support system. Patient would benefit from PT services to address current impairments and facilitate improvement in current activity limits. Educated patient on current POC, initial HEP and current examination findings. Patient verbalized understanding of all education and instruction  provided today.        Clinical Presentation: Stable     Level of Complexity: Low     Goals:      Patient will improve L/R knee strength to >/=4+/5 for improved knee stability.    Patient will improve L/R knee AROM to >/=0-120 deg for improved knee mobility.    Patient will be independent with HEP.    Patient will demonstrate reciprocal gait pattern without assistive device.    Patient will demonstrate reciprocal step stair negotiation pattern.    Patient will improve L/R SLS >/= 10 sec to demonstrate improved stability of bilateral LE.    Patient will improve LEFS score to >/= 40/80 to demonstrate increased weightbearing tolerance and readiness to return to work      Plan  1x/week for 8  visits     Planned interventions include: blood flow restriction, aquatic therapy, biofeedback, cryotherapy, dry needling, edema control, education/instruction, electrical stimulation, gait training, home program, hot pack, kinesiotaping, manual therapy, neuromuscular re-education, self care/home management, therapeutic activities, therapeutic exercises, ultrasound and vasopneumatic device w/ cold.

## 2025-06-09 NOTE — PROGRESS NOTES
"Physical Therapy Treatment    Patient Name: Moses Gutierrez  MRN: [id number]  Today's Date: 6/10/2025  Time Calculation  Start Time: 1146  Stop Time: 1215  Time Calculation (min): 29 min     PT Therapeutic Procedures Time Entry  Manual Therapy Time Entry: 8  Therapeutic Exercise Time Entry: 19                 Current Problem  1. Bimalleolar ankle fracture, right, closed, with routine healing, subsequent encounter  Follow Up In Physical Therapy          Insurance:  Visit number: 2  Approved number of visits: 60  [employer] 60V PT OT LIS YR COPAY 25   DED (MET ) COVERAGE 90 OOP 3000(4777) NO AUTH REQ   Precautions   WBAT  MD orders 5/13/25: \"Patient can continue weight-bear as tolerated. We recommend he wean from crutch and boot.\"    Subjective   Subjective:   Pt reports his ankle feels like he just twisted it. It is okay, but at times gets shooting pain. Pt isn't wearing walking boot much anymore, but does use 1 crutch.   Pain   3/10    Objective   Treatments:  Seated 4-way ankle isometrics 5\"x5 ea  Seated ankle RTB 4-way 20x ea  Seated HR/TR 20x  Seated towel scrunches 1'  Seated towel wipers 1'    PROM R ankle 8'    Not Today:  Seated PF stretch x3 :30 sec with black TB  Seated ankle EV vs YTB 2x12  Seated clamshell vs GTB 2x12  Ankle circles on compliant surface/pillow x10 CCW/CW ea  OP EDUCATION:     Access Code: [email or web address]  URL: [email or web address]  Date: 06/10/2025  Prepared by: Steffany Villeda    Exercises  - Long Sitting Ankle Eversion with Resistance  - 1 x daily - 7 x weekly - 3 sets - 10 reps  - Long Sitting Ankle Inversion with Resistance  - 1 x daily - 7 x weekly - 3 sets - 10 reps  - Long Sitting Ankle Dorsiflexion with Anchored Resistance  - 1 x daily - 7 x weekly - 3 sets - 10 reps  - Long Sitting Ankle Plantar Flexion with Resistance  - 1 x daily - 7 x weekly - 3 sets - 10 reps  - Seated Heel Raise  - 1 x daily - 7 x weekly - 3 sets - 10 reps  - Seated Toe Raise  - 1 x daily - 7 x " weekly - 3 sets - 10 reps  - Ankle Inversion Eversion Towel Slide  - 1 x daily - 7 x weekly - 3 sets - 10 reps    Assessment:   Not all ex completed, as pt arrived to appt 15min late. Pt had some pain at end ranges of PROM. Displays improving DF/PF, however, still limited with ev/inv. Introduced ankle Tb exercises. Pt had some L knee pain when crossing the legs for inv. Pt displayed good intrinsic mobility with towel scrunches. Pt had more difficulty with wipers.     Plan:   Introduce more weight bearing exercises.

## 2025-06-10 ENCOUNTER — TREATMENT (OUTPATIENT)
Dept: PHYSICAL THERAPY | Facility: CLINIC | Age: 65
End: 2025-06-10
Payer: COMMERCIAL

## 2025-06-10 DIAGNOSIS — S82.841D BIMALLEOLAR ANKLE FRACTURE, RIGHT, CLOSED, WITH ROUTINE HEALING, SUBSEQUENT ENCOUNTER: Primary | ICD-10-CM

## 2025-06-10 PROCEDURE — 97140 MANUAL THERAPY 1/> REGIONS: CPT | Mod: GP,CQ

## 2025-06-10 PROCEDURE — 97110 THERAPEUTIC EXERCISES: CPT | Mod: GP,CQ

## 2025-06-17 ENCOUNTER — TREATMENT (OUTPATIENT)
Dept: PHYSICAL THERAPY | Facility: CLINIC | Age: 65
End: 2025-06-17
Payer: COMMERCIAL

## 2025-06-17 DIAGNOSIS — S82.841D BIMALLEOLAR ANKLE FRACTURE, RIGHT, CLOSED, WITH ROUTINE HEALING, SUBSEQUENT ENCOUNTER: ICD-10-CM

## 2025-06-17 PROCEDURE — 97110 THERAPEUTIC EXERCISES: CPT | Mod: GP

## 2025-06-17 PROCEDURE — 97140 MANUAL THERAPY 1/> REGIONS: CPT | Mod: GP

## 2025-06-17 NOTE — PROGRESS NOTES
"Physical Therapy Treatment    Patient Name: Moses Gutierrez  MRN: [id number]  Today's Date: 6/17/2025  Time Calculation  Start Time: 1130  Stop Time: 1213  Time Calculation (min): 43 min     PT Therapeutic Procedures Time Entry  Therapeutic Exercise Time Entry: 25  Manual Therapy Time Entry: 18                 Current Problem  1. Bimalleolar ankle fracture, right, closed, with routine healing, subsequent encounter  Follow Up In Physical Therapy          Insurance:  Visit number: 3  Approved number of visits: 60  [employer] 60V PT OT LIS YR COPAY 25   DED (MET ) COVERAGE 90 OOP 3000(9497) NO AUTH REQ   Precautions   WBAT  MD orders 5/13/25: \"Patient can continue weight-bear as tolerated. We recommend he wean from crutch and boot.\"    Subjective   Subjective:   Pt reports his ankle feels more steady and is \"using it more\". He notes his L knee has been killing him since he has been favoring more. He states being able to drive independently for the last two weeks. He notes stairs are still very difficult but walking has become less labor intensive and more efficient. No other concerns mentioned this date.  Pain   3/10    Objective   Treatments:  Seated ankle CW/CCW rolls on AirEx 10x ea way  Seated ankle RTB 4-way 20x ea  Seated on bolster HR/TR 20x  Short foot x15  Arch lifting (tib post activation) x12 (added to HEP)  Staggered STS (R leg flexed > L) 2x10  Staggered STS (R leg flexed > L) 2x10 RTB around knees  Seated clamshell vs 2x15 3 sec end-range hold (added to HEP)      Manual therapy 88214:  PROM R ankle 8'  Gr I-II M-L calcaneal glides R ankle  Gr I-II A-P subtalar glides R ankle  Gr III-IV P-A 1st MT glides R foot  Distraction plus oscillations into end-range flexion of aurora knee 2x12 ea    OP EDUCATION:   Pt encouraged to focus on intrinsic foot and lateral hip strengthening to promote neutral alignment and minimize excessive eversion when walking. He was encouraged and instructed on the benefit of pushing " through 1st toe to improve push off. Pt acknowledged good understanding and was in agreement to all above information.        Assessment:   Pt tolerated session well but continued to demonstrate difficulty with weightbearing exercises but demonstrated improved gait quality and efficiency after treatment today. Pt will continue to benefit from therapy focusing on progressive weightbearing, ankle ROM, and global strength/stability.     Plan:   Introduce more weight bearing exercises. Cont per POC focusing on ankle stability and global strengthening and range.

## 2025-06-23 NOTE — PROGRESS NOTES
"Physical Therapy Treatment    Patient Name: Moses Gutierrez  MRN: [id number]  Today's Date: 6/24/2025  Time Calculation  Start Time: 1135  Stop Time: 1215  Time Calculation (min): 40 min     PT Therapeutic Procedures Time Entry  Therapeutic Exercise Time Entry: 28  Manual Therapy Time Entry: 10                 Current Problem  1. Bimalleolar ankle fracture, right, closed, with routine healing, subsequent encounter  Follow Up In Physical Therapy          Insurance:  Visit number: 4  Approved number of visits: 60  [employer] 60V PT OT LIS YR COPAY 25   DED (MET ) COVERAGE 90 OOP 3000(2987) NO AUTH REQ   Precautions    WBAT  MD orders 5/13/25: \"Patient can continue weight-bear as tolerated. We recommend he wean from crutch and boot.\"       Subjective   Subjective:   Pt reports that he fell yesterday. His R ankle rolled. He had increased pain and swelling. His L knee also got hurt.   Pain   5/10    Objective   Treatments:  Seated ankle 4-way isometrics 5\"x10 ea  SAQ 0# 2x10  Hooklying hip abd R GTB 2x10  Supine SLR R 10x  S/l hip abd 10x  LAQ 10x  Iso LAQ 5\"x12    Manual therapy 39245:  PROM R ankle 10'    Not today:  Seated ankle CW/CCW rolls on AirEx 10x ea way  Seated ankle RTB 4-way 20x ea  Seated on bolster HR/TR 20x  Short foot x15  Arch lifting (tib post activation) x12 (added to HEP)  Staggered STS (R leg flexed > L) 2x10  Staggered STS (R leg flexed > L) 2x10 RTB around knees  Seated clamshell vs 2x15 3 sec end-range hold (added to HEP)        Gr I-II M-L calcaneal glides R ankle  Gr I-II A-P subtalar glides R ankle  Gr III-IV P-A 1st MT glides R foot  Distraction plus oscillations into end-range flexion of aurora knee 2x12 ea     OP EDUCATION:   Don't push through ankle pain with ex      Assessment:   Due to pt newly injuring R ankle and L knee, performed mostly table strengthening ex. Pt limited with abd motion. Pt felt increased ant tib pain with SLRs. Pt had difficulty keeping ankle DF with many of ex. Pt did " well with knee ext iso push. Fair tolerance to therapy ex    Plan:    Pt to ice and rest his ankle and as it starts to feel better, reintroduce HEP

## 2025-06-24 ENCOUNTER — TREATMENT (OUTPATIENT)
Dept: PHYSICAL THERAPY | Facility: CLINIC | Age: 65
End: 2025-06-24
Payer: COMMERCIAL

## 2025-06-24 DIAGNOSIS — S82.841D BIMALLEOLAR ANKLE FRACTURE, RIGHT, CLOSED, WITH ROUTINE HEALING, SUBSEQUENT ENCOUNTER: Primary | ICD-10-CM

## 2025-06-24 PROCEDURE — 97110 THERAPEUTIC EXERCISES: CPT | Mod: GP,CQ

## 2025-06-24 PROCEDURE — 97140 MANUAL THERAPY 1/> REGIONS: CPT | Mod: GP,CQ

## 2025-07-08 ENCOUNTER — OFFICE VISIT (OUTPATIENT)
Dept: ORTHOPEDIC SURGERY | Facility: CLINIC | Age: 65
End: 2025-07-08
Payer: COMMERCIAL

## 2025-07-08 ENCOUNTER — APPOINTMENT (OUTPATIENT)
Dept: PHYSICAL THERAPY | Facility: CLINIC | Age: 65
End: 2025-07-08
Payer: COMMERCIAL

## 2025-07-08 ENCOUNTER — HOSPITAL ENCOUNTER (OUTPATIENT)
Dept: RADIOLOGY | Facility: CLINIC | Age: 65
Discharge: HOME | End: 2025-07-08
Payer: COMMERCIAL

## 2025-07-08 DIAGNOSIS — Z98.890 S/P HARDWARE REMOVAL: ICD-10-CM

## 2025-07-08 DIAGNOSIS — S82.841A BIMALLEOLAR ANKLE FRACTURE, RIGHT, CLOSED, INITIAL ENCOUNTER: ICD-10-CM

## 2025-07-08 PROCEDURE — 99024 POSTOP FOLLOW-UP VISIT: CPT | Performed by: ORTHOPAEDIC SURGERY

## 2025-07-08 PROCEDURE — 4010F ACE/ARB THERAPY RXD/TAKEN: CPT | Performed by: ORTHOPAEDIC SURGERY

## 2025-07-08 PROCEDURE — 1036F TOBACCO NON-USER: CPT | Performed by: ORTHOPAEDIC SURGERY

## 2025-07-08 PROCEDURE — 99212 OFFICE O/P EST SF 10 MIN: CPT | Performed by: ORTHOPAEDIC SURGERY

## 2025-07-08 PROCEDURE — 73610 X-RAY EXAM OF ANKLE: CPT | Mod: RT

## 2025-07-08 PROCEDURE — 1159F MED LIST DOCD IN RCRD: CPT | Performed by: ORTHOPAEDIC SURGERY

## 2025-07-08 PROCEDURE — 73610 X-RAY EXAM OF ANKLE: CPT | Mod: RIGHT SIDE | Performed by: ORTHOPAEDIC SURGERY

## 2025-07-08 NOTE — PROGRESS NOTES
7/8/2025    Chief Complaint   Patient presents with    Right Ankle - Follow-up     Rt ankle hardware removal 5/2/2025  Xray today       History of Present Illness:  Patient Moses Gutierrez , 65 y.o. male, presents today, 7/8/2025, for evaluation of right ankle hardware removal, approximately 2 months postop.  Patient has been weightbearing as tolerated he is weaned from the boot and the cane.  He is working with therapy for motion recovery as well as gait and balance training and feels this is going well and he is making good progress with it.  He is inquiring about his return to work status.         Review of Systems:   GENERAL: Negative  GI: Negative  MUSCULOSKELETAL: See HPI  SKIN: Negative  NEURO:  Negative     Physical Exam:  GENERAL:  Alert and oriented to person, place, and time.  No acute distress and breathing comfortably; pleasant and cooperative with the examination.  HEENT:  Head is normocephalic and atraumatic.  NECK:  Supple, no visible swelling.  CARDIOVASCULAR:  No palpable tachycardia.  LUNGS:  No audible wheezing or labored breathing.  ABDOMEN:  Nondistended.  Extremities: Evaluation of the right lower extremity finds the patient to have a palpable dorsalis pedis pulse to palpation with brisk capillary refill through the toes. The patient has intact sensorium to tibial, sural, saphenous, deep and superficial peroneal nerves to light touch. EHL, FHL, dorsiflexion and plantarflexion are intact to motor. No lymphedema or lymphatic streaking. No signs of deep vein thrombosis. No open wounds. No signs of infection. Supple compartments to the thigh, leg and foot.  Surgical incisions are well-healed.  He demonstrates dorsiflexion about 20 degrees.  No tenderness palpation over the medial or lateral malleoli.     Imaging/Test Results:  3 views of the ankle show evidence of healed bimalleolar equivalent fracture.  Good alignment throughout all 3 planes.  No evidence of hardware failure or migration.      Assessment:  Right ankle hardware removal syndesmotic screws, 2 months postop doing well.     Plan:  Continue with weightbearing activities as tolerated.  Continue work with therapy on endurance, strengthening, motion recovery.  Follow-up with our office in as-needed basis.  Given a note to return to work in 6 weeks without restrictions.  All questions answered at today's visit.      Billie Lai PA-C

## 2025-07-08 NOTE — LETTER
July 8, 2025     Patient: Moses Gutierrez   YOB: 1960   Date of Visit: 7/8/2025       To Whom It May Concern:    It is my medical opinion that Moses Gutierrez may return to work on 18, August 2025 with no restrictions .    If you have any questions or concerns, please don't hesitate to call [phone].         Sincerely,        Alfred Almanzar, DO

## 2025-07-11 ENCOUNTER — OFFICE VISIT (OUTPATIENT)
Dept: ORTHOPEDIC SURGERY | Facility: CLINIC | Age: 65
End: 2025-07-11
Payer: MEDICARE

## 2025-07-11 ENCOUNTER — HOSPITAL ENCOUNTER (OUTPATIENT)
Dept: RADIOLOGY | Facility: HOSPITAL | Age: 65
Discharge: HOME | End: 2025-07-11
Payer: MEDICARE

## 2025-07-11 DIAGNOSIS — M65.962 SYNOVITIS OF LEFT KNEE: Primary | ICD-10-CM

## 2025-07-11 DIAGNOSIS — M25.562 LEFT KNEE PAIN, UNSPECIFIED CHRONICITY: ICD-10-CM

## 2025-07-11 PROCEDURE — 73560 X-RAY EXAM OF KNEE 1 OR 2: CPT | Mod: LT

## 2025-07-11 RX ORDER — LIDOCAINE HYDROCHLORIDE 10 MG/ML
4 INJECTION, SOLUTION INFILTRATION; PERINEURAL
Status: COMPLETED | OUTPATIENT
Start: 2025-07-11 | End: 2025-07-11

## 2025-07-11 RX ORDER — TRIAMCINOLONE ACETONIDE 40 MG/ML
1 INJECTION, SUSPENSION INTRA-ARTICULAR; INTRAMUSCULAR
Status: COMPLETED | OUTPATIENT
Start: 2025-07-11 | End: 2025-07-11

## 2025-07-11 RX ADMIN — LIDOCAINE HYDROCHLORIDE 4 ML: 10 INJECTION, SOLUTION INFILTRATION; PERINEURAL at 11:11

## 2025-07-11 RX ADMIN — TRIAMCINOLONE ACETONIDE 1 ML: 40 INJECTION, SUSPENSION INTRA-ARTICULAR; INTRAMUSCULAR at 11:11

## 2025-07-11 NOTE — PROGRESS NOTES
History of Present Illness  Chief Complaint   Patient presents with    Left Knee - New Patient Visit     Xrays today       Patient presents with ongoing knee pain that has been affecting him for several months as a result of increased activity on the left lower extremity due to restricted weightbearing for multiple months on his right ankle.  Patient localizes the pain diffusely throughout his knee and worse with bending.    Medical History[1]    Medication Documentation Review Audit       Reviewed by Em Stanley MA (Medical Assistant) on 07/11/25 at 1033      Medication Order Taking? Sig Documenting Provider Last Dose Status   atorvastatin (Lipitor) 80 mg tablet [id number]  Take 1 tablet (80 mg) by mouth once daily at bedtime. Jose Cross MD  Active   clopidogrel (Plavix) 75 mg tablet [id number] No Take 1 tablet (75 mg) by mouth once daily. Historical Provider, MD Taking Active   hydroxychloroquine (Plaquenil) 200 mg tablet [id number] No Take 1 tablet (200 mg) by mouth 2 times a day. AS DIRECTED Historical Provider, MD Taking Active   lisinopril 10 mg tablet [id number] No Take 1 tablet (10 mg) by mouth once daily. Historical Provider, MD Taking Active   metoprolol succinate XL (Toprol-XL) 25 mg 24 hr tablet [id number] No Take 1 tablet (25 mg) by mouth once daily. Historical Provider, MD Taking Active   nitroglycerin (Nitrostat) 0.4 mg SL tablet [id number]  Place 1 tablet (0.4 mg) under the tongue every 5 minutes if needed for chest pain (for up to 3 doses). Jose Cross MD  Active   pregabalin (Lyrica) 75 mg capsule [id number] No Take 1 capsule (75 mg) by mouth 3 times a day. Historical Provider, MD Taking Active   tamsulosin (Flomax) 0.4 mg 24 hr capsule [id number] No Take 1 capsule (0.4 mg) by mouth once daily. Historical Provider, MD Taking Active   tiZANidine (Zanaflex) 4 mg tablet [id number] No Take 1 tablet (4 mg) by mouth 4 times a day as needed. Historical Provider, MD Taking Active    traMADol (Ultram) 50 mg tablet [id number] No Take 1 tablet (50 mg) by mouth 3 times a day. Historical Provider, MD Taking Differently Active                    RX Allergies[2]    Social History     Socioeconomic History    Marital status:      Spouse name: Not on file    Number of children: Not on file    Years of education: Not on file    Highest education level: Not on file   Occupational History    Not on file   Tobacco Use    Smoking status: Former     Current packs/day: 0.00     Types: Cigarettes     Start date:      Quit date:      Years since quittin.5    Smokeless tobacco: Never    Tobacco comments:     1 pack a week   Substance and Sexual Activity    Alcohol use: Not Currently     Comment: occasionallyt, rare    Drug use: Never    Sexual activity: Not on file   Other Topics Concern    Not on file   Social History Narrative    Not on file     Social Drivers of Health     Financial Resource Strain: Not on file   Food Insecurity: Not on file   Transportation Needs: Not on file   Physical Activity: Not on file   Stress: Not on file   Social Connections: Not on file   Intimate Partner Violence: Not on file   Housing Stability: Not on file       Surgical History[3]         Review of Systems   GENERAL: Negative for malaise, significant weight loss, fever  MUSCULOSKELETAL: see HPI  NEURO:  Negative      Exam  Left hip: Range of motion of the hip does not produce pain at the knee.  Passive range of motion of the hip is full.  Knee: Range of motion of the left knee is between 5-120 degrees.    Active range of motion of the knee is comparable to the passive range of motion of the knee.    Patient is able to hold the leg extended.  Patient is tender to palpation about the medial and lateral joint line.    Patient is nontender to palpation about the rest of the knee.    Anterior/posterior and varus/valgus stressing of the knee does not elicit any significant pain nor observed laxity.    Neurovascularly intact distally able to motor ankle appropriately and lower extremity is warm and well-perfused      Imaging  AP and lateral imaging of the left knee was obtained on the date of this exam to evaluate for cause of the left knee pain    Imaging demonstrates no substantial arthritic change with small osteophytes appreciated along the medial joint line.    L Inj/Asp: L knee on 7/11/2025 11:11 AM  Indications: pain  Details: 22 G needle, anterior approach  Medications: 1 mL triamcinolone acetonide 40 mg/mL; 4 mL lidocaine 10 mg/mL (1 %)  Outcome: tolerated well, no immediate complications  Procedure, treatment alternatives, risks and benefits explained, specific risks discussed. Immediately prior to procedure a time out was called to verify the correct patient, procedure, equipment, support staff and site/side marked as required. Patient was prepped and draped in the usual sterile fashion.              Assessment  Patient with synovitis of the Left knee     Plan  Patient experiencing an acute flare of synovitis of the left knee.  Treatment options were discussed with patient and did elect to proceed with continued conservative measures to address this.  Patient's condition is likely to be chronic in nature.  This will result in intermittent flares of which ongoing treatment may be required as these acute flares present themselves.  As patient condition progresses this will continue to decrease patient's functional capacity to perform activities of daily living.  Patient underwent steroid injection and tolerated this well.  Patient was instructed to ice, elevate and gently range knee over the next 24 to 48 hours to allow medication to take effect and prevent local reaction.  Patient made aware that maximal effect of steroid medication takes place in 1 week's time and may last for up to several months.            [1] No past medical history on file.  [2] No Known Allergies  [3]   Past Surgical  History:  Procedure Laterality Date    CORONARY ANGIOPLASTY      OTHER SURGICAL HISTORY  06/01/2022    Colonoscopy    OTHER SURGICAL HISTORY  06/01/2022    Cardiac catheterization with stent placement    OTHER SURGICAL HISTORY  06/01/2022    Ganglion cyst excision    OTHER SURGICAL HISTORY  06/01/2022    Prostate surgery    OTHER SURGICAL HISTORY  06/01/2022    Prostate needle biopsy

## 2025-07-15 ENCOUNTER — APPOINTMENT (OUTPATIENT)
Dept: PHYSICAL THERAPY | Facility: CLINIC | Age: 65
End: 2025-07-15
Payer: COMMERCIAL

## 2025-07-22 ENCOUNTER — APPOINTMENT (OUTPATIENT)
Dept: PHYSICAL THERAPY | Facility: CLINIC | Age: 65
End: 2025-07-22
Payer: COMMERCIAL

## 2025-08-01 ENCOUNTER — APPOINTMENT (OUTPATIENT)
Dept: CARDIOLOGY | Facility: CLINIC | Age: 65
End: 2025-08-01
Payer: COMMERCIAL

## 2025-08-01 VITALS
DIASTOLIC BLOOD PRESSURE: 68 MMHG | WEIGHT: 228.3 LBS | BODY MASS INDEX: 32.69 KG/M2 | SYSTOLIC BLOOD PRESSURE: 140 MMHG | HEIGHT: 70 IN | HEART RATE: 72 BPM

## 2025-08-01 DIAGNOSIS — E11.9 TYPE 2 DIABETES MELLITUS WITHOUT COMPLICATION, UNSPECIFIED WHETHER LONG TERM INSULIN USE: ICD-10-CM

## 2025-08-01 DIAGNOSIS — Z85.46 HISTORY OF PROSTATE CANCER: ICD-10-CM

## 2025-08-01 DIAGNOSIS — Z98.61 CAD S/P PERCUTANEOUS CORONARY ANGIOPLASTY: ICD-10-CM

## 2025-08-01 DIAGNOSIS — I25.10 CAD S/P PERCUTANEOUS CORONARY ANGIOPLASTY: ICD-10-CM

## 2025-08-01 DIAGNOSIS — I10 BENIGN ESSENTIAL HYPERTENSION: ICD-10-CM

## 2025-08-01 DIAGNOSIS — E78.2 MIXED HYPERLIPIDEMIA: ICD-10-CM

## 2025-08-01 DIAGNOSIS — Z87.891 FORMER CIGARETTE SMOKER: ICD-10-CM

## 2025-08-01 PROCEDURE — 3008F BODY MASS INDEX DOCD: CPT | Performed by: INTERNAL MEDICINE

## 2025-08-01 PROCEDURE — 3078F DIAST BP <80 MM HG: CPT | Performed by: INTERNAL MEDICINE

## 2025-08-01 PROCEDURE — 3077F SYST BP >= 140 MM HG: CPT | Performed by: INTERNAL MEDICINE

## 2025-08-01 PROCEDURE — 1159F MED LIST DOCD IN RCRD: CPT | Performed by: INTERNAL MEDICINE

## 2025-08-01 PROCEDURE — 99214 OFFICE O/P EST MOD 30 MIN: CPT | Performed by: INTERNAL MEDICINE

## 2025-08-01 RX ORDER — CLOPIDOGREL BISULFATE 75 MG/1
75 TABLET ORAL DAILY
Qty: 90 TABLET | Refills: 3 | Status: SHIPPED | OUTPATIENT
Start: 2025-08-01 | End: 2026-08-01

## 2025-08-01 NOTE — PATIENT INSTRUCTIONS
Patient to follow up in 1 year with Dr. Jose Ivan MD      Please RESTART Clopidogrel (Plavix) 75mg once daily     No other changes today.   Continue same medications and treatments.   Patient educated on proper medication use.   Patient educated on risk factor modification.   Please bring any lab results from other providers / physicians to your next appointment.     Please bring all medicines, vitamins, and herbal supplements with you when you come to the office.     Prescriptions will not be filled unless you are compliant with your follow up appointments or have a follow up appointment scheduled as per instruction of your physician. Refills should be requested at the time of your visit.    ISilvio RN am scribing for and in the presence of Dr. Jose Cross MD

## 2025-08-01 NOTE — PROGRESS NOTES
CARDIOLOGY OFFICE VISIT      CHIEF COMPLAINT  Chief Complaint   Patient presents with    Follow-up     1 year follow-up for management of CAD, hypertension & hyperlipidemia        HISTORY OF PRESENT ILLNESS  The patient states that in February of this year he fractured his lower leg and ankle.  He had to have 2 surgeries for that.  He states he is now finally walking much better now with a cane.  He denies chest discomfort or symptoms of myocardial ischemia.  He denies any problem with dyspnea.  He denies palpitations syncope.  I did go over his most recent lipid profile which is very good.  His LDL cholesterol is 53.  He is not taking aspirin or clopidogrel.  I told him he needs to go back on those.  We have decided to go back on clopidogrel 75 mg a day.      IMPRESSION:   1. Coronary artery disease, no angina.  2. Percutaneous coronary intervention with drug-eluting stent, mid right coronary July 24, 2018.  3. Essential hypertension.  4. Mixed hyperlipidemia.  5. Obesity  6. Former smoker.  7. Prostate cancer, treated with radiation seed implants  8. COVID-19 infection November 2020  9. Diabetes Mellitus, Type 2. Diet Controlled     Please excuse any errors in grammar or translation related to this dictation. Voice recognition software was utilized to prepare this document.       Past Medical History  Medical History[1]    Social History  Social History[2]    Family History   Family History[3]     Allergies:  RX Allergies[4]     Outpatient Medications:  Current Outpatient Medications   Medication Instructions    atorvastatin (LIPITOR) 80 mg, oral, Nightly    nitroglycerin (NITROSTAT) 0.4 mg, sublingual, Every 5 min PRN    tamsulosin (FLOMAX) 0.4 mg, Daily    tiZANidine (ZANAFLEX) 4 mg, 4 times daily PRN          REVIEW OF SYSTEMS  Review of Systems   Constitutional:        Walking cane for assistance    Musculoskeletal:  Positive for arthritis and joint pain.   All other systems reviewed and are  negative.        VITALS  Vitals:    08/01/25 1046   BP: 140/68   Pulse: 72       PHYSICAL EXAM  Vitals reviewed.   Constitutional:       Appearance: Normal and healthy appearance. Well-developed and not in distress.   Eyes:      Conjunctiva/sclera: Conjunctivae normal.      Pupils: Pupils are equal, round, and reactive to light.   Neck:      Vascular: No JVR. JVD normal.   Pulmonary:      Effort: Pulmonary effort is normal.      Breath sounds: Normal breath sounds. No wheezing. No rhonchi. No rales.   Chest:      Chest wall: Not tender to palpatation.   Cardiovascular:      PMI at left midclavicular line. Normal rate. Regular rhythm. Normal S1. Normal S2.       Murmurs: There is a grade 1/6 systolic murmur. At base       No gallop.  No click. No rub.   Pulses:     Intact distal pulses.   Edema:     Peripheral edema absent.   Abdominal:      Tenderness: There is no abdominal tenderness.   Musculoskeletal: Normal range of motion.         General: No tenderness.      Cervical back: Normal range of motion. Skin:     General: Skin is warm and dry.   Neurological:      General: No focal deficit present.      Mental Status: Alert and oriented to person, place and time.   Psychiatric:         Behavior: Behavior is cooperative.         ASSESSMENT AND PLAN  Diagnoses and all orders for this visit:  CAD S/P percutaneous coronary angioplasty  Benign essential hypertension  Mixed hyperlipidemia  Type 2 diabetes mellitus without complication, unspecified whether long term insulin use  Former cigarette smoker  History of prostate cancer        ISilvio RN   am scribing for, and in the presence of Dr. Jose Mcmillan MD  .    I, Dr. Jose Mcmillan MD  , personally performed the services described in the documentation as scribed by Silvio Luke RN   in my presence, and confirm it is both accurate and complete.      Dr. Jose Mcmillan MD  Thank you for allowing me to participate in the care of this  patient. Please do not hesitate to contact me with any further questions or concerns.         [1] History reviewed. No pertinent past medical history.  [2]   Social History  Tobacco Use    Smoking status: Former     Current packs/day: 0.00     Types: Cigarettes     Start date:      Quit date:      Years since quittin.5    Smokeless tobacco: Never    Tobacco comments:     1 pack a week   Substance Use Topics    Alcohol use: Not Currently     Comment: occasionallyt, rare    Drug use: Never   [3]   Family History  Problem Relation Name Age of Onset    Heart disease Mother      Hyperlipidemia Mother      Hypertension Mother      Stroke Mother      Diabetes Mother     [4] No Known Allergies

## 2026-08-07 ENCOUNTER — APPOINTMENT (OUTPATIENT)
Dept: CARDIOLOGY | Facility: CLINIC | Age: 66
End: 2026-08-07
Payer: COMMERCIAL